# Patient Record
Sex: MALE | Race: WHITE | NOT HISPANIC OR LATINO | URBAN - METROPOLITAN AREA
[De-identification: names, ages, dates, MRNs, and addresses within clinical notes are randomized per-mention and may not be internally consistent; named-entity substitution may affect disease eponyms.]

---

## 2017-05-19 ENCOUNTER — OUTPATIENT (OUTPATIENT)
Dept: OUTPATIENT SERVICES | Facility: HOSPITAL | Age: 67
LOS: 1 days | Discharge: HOME | End: 2017-05-19
Payer: MEDICARE

## 2017-05-19 VITALS
HEART RATE: 66 BPM | OXYGEN SATURATION: 96 % | HEIGHT: 71 IN | WEIGHT: 184.97 LBS | RESPIRATION RATE: 16 BRPM | TEMPERATURE: 96 F | DIASTOLIC BLOOD PRESSURE: 91 MMHG | SYSTOLIC BLOOD PRESSURE: 138 MMHG

## 2017-05-19 VITALS
SYSTOLIC BLOOD PRESSURE: 131 MMHG | DIASTOLIC BLOOD PRESSURE: 86 MMHG | HEART RATE: 59 BPM | RESPIRATION RATE: 18 BRPM | OXYGEN SATURATION: 96 %

## 2017-05-19 DIAGNOSIS — Z98.890 OTHER SPECIFIED POSTPROCEDURAL STATES: Chronic | ICD-10-CM

## 2017-05-19 LAB
APPEARANCE UR: CLEAR — SIGNIFICANT CHANGE UP
BILIRUB UR-MCNC: NEGATIVE — SIGNIFICANT CHANGE UP
COLOR SPEC: YELLOW — SIGNIFICANT CHANGE UP
DIFF PNL FLD: NEGATIVE — SIGNIFICANT CHANGE UP
GLUCOSE UR QL: NEGATIVE — SIGNIFICANT CHANGE UP
KETONES UR-MCNC: NEGATIVE — SIGNIFICANT CHANGE UP
LEUKOCYTE ESTERASE UR-ACNC: NEGATIVE — SIGNIFICANT CHANGE UP
NITRITE UR-MCNC: NEGATIVE — SIGNIFICANT CHANGE UP
PH UR: 6 — SIGNIFICANT CHANGE UP (ref 5–8)
PROT UR-MCNC: NEGATIVE MG/DL — SIGNIFICANT CHANGE UP
SP GR SPEC: 1.02 — SIGNIFICANT CHANGE UP (ref 1–1.03)
UROBILINOGEN FLD QL: 0.2 E.U./DL — SIGNIFICANT CHANGE UP

## 2017-05-19 PROCEDURE — 81003 URINALYSIS AUTO W/O SCOPE: CPT

## 2017-05-19 PROCEDURE — 27430 REVISION OF THIGH MUSCLES: CPT | Mod: LT

## 2017-05-19 PROCEDURE — 93010 ELECTROCARDIOGRAM REPORT: CPT

## 2017-05-19 PROCEDURE — 93005 ELECTROCARDIOGRAM TRACING: CPT

## 2017-05-19 RX ORDER — OXYCODONE HYDROCHLORIDE 5 MG/1
1 TABLET ORAL
Qty: 42 | Refills: 0
Start: 2017-05-19 | End: 2017-05-26

## 2017-05-19 RX ORDER — SODIUM CHLORIDE 9 MG/ML
1000 INJECTION, SOLUTION INTRAVENOUS
Qty: 0 | Refills: 0 | Status: DISCONTINUED | OUTPATIENT
Start: 2017-05-19 | End: 2017-05-19

## 2017-05-24 DIAGNOSIS — S76.112A STRAIN OF LEFT QUADRICEPS MUSCLE, FASCIA AND TENDON, INITIAL ENCOUNTER: ICD-10-CM
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
## 2017-05-24 DIAGNOSIS — I10 ESSENTIAL (PRIMARY) HYPERTENSION: ICD-10-CM

## 2017-05-24 DIAGNOSIS — Z79.899 OTHER LONG TERM (CURRENT) DRUG THERAPY: ICD-10-CM

## 2018-10-10 PROBLEM — I10 ESSENTIAL (PRIMARY) HYPERTENSION: Chronic | Status: ACTIVE | Noted: 2017-05-19

## 2018-10-15 ENCOUNTER — RESULT REVIEW (OUTPATIENT)
Age: 68
End: 2018-10-15

## 2018-10-15 ENCOUNTER — OUTPATIENT (OUTPATIENT)
Dept: OUTPATIENT SERVICES | Facility: HOSPITAL | Age: 68
LOS: 1 days | Discharge: HOME | End: 2018-10-15

## 2018-10-15 DIAGNOSIS — Z98.890 OTHER SPECIFIED POSTPROCEDURAL STATES: Chronic | ICD-10-CM

## 2018-10-18 LAB — SURGICAL PATHOLOGY STUDY: SIGNIFICANT CHANGE UP

## 2019-02-03 NOTE — ASU PREOP CHECKLIST - AICD PRESENT
no I, Bhupendra Virk, performed the initial face to face bedside interview with this patient regarding history of present illness, review of symptoms and relevant past medical, social and family history.  I completed an independent physical examination.  I was the initial provider who evaluated this patient. I have signed out the follow up of any pending tests (i.e. labs, radiological studies) to the ACP.  I have communicated the patient’s plan of care and disposition with the ACP.

## 2019-04-11 ENCOUNTER — RECORD ABSTRACTING (OUTPATIENT)
Age: 69
End: 2019-04-11

## 2019-04-11 ENCOUNTER — APPOINTMENT (OUTPATIENT)
Dept: ORTHOPEDIC SURGERY | Facility: CLINIC | Age: 69
End: 2019-04-11
Payer: MEDICARE

## 2019-04-11 DIAGNOSIS — M25.469 EFFUSION, UNSPECIFIED KNEE: ICD-10-CM

## 2019-04-11 DIAGNOSIS — Z78.9 OTHER SPECIFIED HEALTH STATUS: ICD-10-CM

## 2019-04-11 DIAGNOSIS — M17.11 UNILATERAL PRIMARY OSTEOARTHRITIS, RIGHT KNEE: ICD-10-CM

## 2019-04-11 PROBLEM — Z00.00 ENCOUNTER FOR PREVENTIVE HEALTH EXAMINATION: Status: ACTIVE | Noted: 2019-04-11

## 2019-04-11 PROCEDURE — 20611 DRAIN/INJ JOINT/BURSA W/US: CPT | Mod: RT

## 2019-04-11 PROCEDURE — 99213 OFFICE O/P EST LOW 20 MIN: CPT | Mod: 25

## 2019-04-11 PROCEDURE — 99203 OFFICE O/P NEW LOW 30 MIN: CPT | Mod: 25

## 2019-04-11 RX ORDER — HYALURONATE SODIUM 30 MG/2 ML
30 SYRINGE (ML) INTRAARTICULAR
Refills: 0 | Status: ACTIVE | COMMUNITY

## 2019-04-11 NOTE — PROCEDURE
[de-identified] : Patient was given the presence of a series of 3 right knee Orthovisc injection today. Patient had the injection performed on the lateral aspect of the right knee under sterile conditions and ultrasound guidance

## 2019-04-11 NOTE — PHYSICAL EXAM
[de-identified] : Right knee has tenderness at the medial joint line there is warmth about the knee mild R. supination range of motion is 8-110°. Neurovascularly intact distally. No effusion noted today.

## 2019-04-11 NOTE — HISTORY OF PRESENT ILLNESS
[de-identified] : Dr. Magallanes is here for a first incision of 3 Orthovisc injections into the right knee replacement has osteoarthritis tricompartmental

## 2019-04-17 ENCOUNTER — LABORATORY RESULT (OUTPATIENT)
Age: 69
End: 2019-04-17

## 2019-04-18 ENCOUNTER — APPOINTMENT (OUTPATIENT)
Dept: ORTHOPEDIC SURGERY | Facility: CLINIC | Age: 69
End: 2019-04-18
Payer: MEDICARE

## 2019-04-18 PROCEDURE — 99214 OFFICE O/P EST MOD 30 MIN: CPT | Mod: 25

## 2019-04-18 PROCEDURE — 20611 DRAIN/INJ JOINT/BURSA W/US: CPT | Mod: RT

## 2019-04-18 NOTE — HISTORY OF PRESENT ILLNESS
[de-identified] : SEEN LAST WEEK RIGHT KNEE PAIN - ASPIRATED. FEELS LIKE KNEE IS FILLED WITH FLUID AGAIN.

## 2019-04-18 NOTE — DISCUSSION/SUMMARY
[de-identified] : Patient presented with a second effusion we will send the specimen for culture and fluid analysis.

## 2019-04-18 NOTE — PHYSICAL EXAM
[de-identified] : Patient is a moderate-sized right knee effusion today. There is no warmth no drainage no erythema. Range of motion is 0-110°. The knee is stable

## 2019-04-18 NOTE — PROCEDURE
[de-identified] : Patient had the right knee aspirated yielding approximately 30 cc of clear straw-colored fluid. Patient tolerated well using the same needle as an entry portal we were able to give a second series of 3 Orthovisc injections.

## 2019-05-02 ENCOUNTER — APPOINTMENT (OUTPATIENT)
Dept: ORTHOPEDIC SURGERY | Facility: CLINIC | Age: 69
End: 2019-05-02

## 2021-12-23 ENCOUNTER — APPOINTMENT (OUTPATIENT)
Dept: ORTHOPEDIC SURGERY | Facility: CLINIC | Age: 71
End: 2021-12-23
Payer: MEDICARE

## 2021-12-23 PROCEDURE — 73562 X-RAY EXAM OF KNEE 3: CPT | Mod: RT

## 2021-12-23 PROCEDURE — 99214 OFFICE O/P EST MOD 30 MIN: CPT | Mod: 25

## 2021-12-23 PROCEDURE — 20611 DRAIN/INJ JOINT/BURSA W/US: CPT | Mod: RT

## 2021-12-23 RX ORDER — HYALURONATE SODIUM, STABILIZED 88 MG/4 ML
88 SYRINGE (ML) INTRAARTICULAR
Qty: 1 | Refills: 0 | Status: ACTIVE | OUTPATIENT
Start: 2021-12-23

## 2021-12-23 NOTE — PHYSICAL EXAM
[de-identified] : Right knee has definite warmth medial joint line tenderness is noted there is crepitus with range of motion 0-130°. No obvious effusion is noted there is definite tenderness with patellofemoral compression. [de-identified] : AP standing individual and sunrise views were obtained showing severe patellofemoral arthritis with complete cartilage loss in the lateral patella facet and moderate to advanced narrowing of medial compartment on standing AP projection of the right

## 2021-12-23 NOTE — DISCUSSION/SUMMARY
[de-identified] : Patient is suffering from advanced patellofemoral arthritis as well as moderate to advanced medial compartment arthritis of the right knee. We talked at length about the potential need for knee replacement surgery in the future for now we will continue with conservative measures in addition to the cortisone injection patient was prescribed a Medrol Dosepak and he will return to the office once his modest injection has arrived.

## 2021-12-23 NOTE — HISTORY OF PRESENT ILLNESS
[de-identified] : This is an old patient not seen in nearly 2-1/2 years. He is here with a complaint of worsening right knee pain he has pain with sitting going up and down stairs getting out of a seated position he also has in addition to anterior knee pain pain in the posterior lateral aspect of the knee. He presents with outside MRI the findings of which show no muscular issues but severe patellofemoral arthritis and moderate medial compartment arthritis. Patient states his pain is worsening and has been ongoing for 6 months.\par \par The patient states he's tried all types of anti-inflammatory medications such as Celebrex with minimal improvement.

## 2021-12-23 NOTE — PROCEDURE
[de-identified] : Patient was given a cortisone injection (Lidocaine 1% 4 cc + 10 mg of Kenalog) in the lateral compartment of the knee. Injection is performed under sterile conditions with ultrasound guidance. Patient tolerated procedure well. If patient has a history of insulin- dependant diabetes they were informed of possible increase of blood glucose levels and instructed to adjust insulin accordingly.\par \par

## 2022-01-13 ENCOUNTER — APPOINTMENT (OUTPATIENT)
Dept: ORTHOPEDIC SURGERY | Facility: CLINIC | Age: 72
End: 2022-01-13
Payer: MEDICARE

## 2022-01-13 PROCEDURE — 99213 OFFICE O/P EST LOW 20 MIN: CPT

## 2022-01-13 NOTE — DISCUSSION/SUMMARY
[de-identified] : Surgical risks reviewed. The reasonable risks and benefits of knee replacement surgery discussed in detail with the patient. Patient asked appropriate questions which were answered to their satisfaction. We talked about potential complications including complications they may require revision surgery such as component loosening failure migration wear and also potential complications of infection and stiffness.\par \par We will see how the patient responds to these conservative treatments. We talked about the potential reuse of such medication every 6 months if sustained clinical benefit was achieved.\par \par

## 2022-01-13 NOTE — HISTORY OF PRESENT ILLNESS
[de-identified] : Patient returns today he felt the only modest improvement in 2-3 weeks with the cortisone injection he received in the right knee on the last visit. He is here to discuss further options.

## 2022-01-13 NOTE — PHYSICAL EXAM
[de-identified] : Right knee on exam there is definite warmth about the medial joint line. There is also crepitus with range of motion 0-125°. Pain is elicited with patellofemoral compression. The knee is stable to AP stress varus valgus stress both in full extension and 90° of flexion.

## 2022-01-13 NOTE — REASON FOR VISIT
[Follow-Up Visit] : a follow-up visit for [Knee Pain] : knee pain [FreeTextEntry2] : right knee follow up

## 2022-02-17 ENCOUNTER — APPOINTMENT (OUTPATIENT)
Dept: ORTHOPEDIC SURGERY | Facility: CLINIC | Age: 72
End: 2022-02-17
Payer: MEDICARE

## 2022-02-17 PROCEDURE — 99213 OFFICE O/P EST LOW 20 MIN: CPT | Mod: 25

## 2022-02-17 PROCEDURE — 20611 DRAIN/INJ JOINT/BURSA W/US: CPT | Mod: RT

## 2022-02-17 NOTE — PROCEDURE
[de-identified] : Patient was given a cortisone injection (Lidocaine 1% 4 cc + 10 mg of Kenalog) in the lateral compartment of the right  knee. Injection is performed under sterile conditions with ultrasound guidance. Patient tolerated procedure well. If patient has a history of insulin- dependant diabetes they were informed of possible increase of blood glucose levels and instructed to adjust insulin accordingly.\par \par

## 2022-02-17 NOTE — HISTORY OF PRESENT ILLNESS
[de-identified] : Patient returns today with increasing right knee pain. Patient states the pain is mostly in the posterior lateral area of the knee in the soft tissues. He states that the recent Synvisc one injection seemed to actually make things worse versus improving his symptoms. Patient has difficulty with driving his difficulty was standing out of a seated position and going up and down steps the pain seems to be worsening well localized to the right knee. Recall he had an MRI recently that showed fairly advanced patellofemoral and medial compartment osteoarthritis.

## 2022-02-17 NOTE — PHYSICAL EXAM
[de-identified] : Right knee has definite warmth medial joint line tenderness is noted there is crepitus with range of motion 0-130°. No obvious effusion is noted there is definite tenderness with patellofemoral compression.

## 2022-02-17 NOTE — DISCUSSION/SUMMARY
[Surgical risks reviewed] : Surgical risks reviewed [de-identified] : Patient's diagnosis is that of advanced osteoarthritis of the right knee. We spoke at length about her options I believe he would be best to consider knee replacement surgery at this point. The reasonable risks and benefits were discussed patient asked appropriate questions. As a temporizing measure patient was given a cortisone injection today into the lateral compartment of the right knee. She will continue to take anti-inflammatories as needed ice the knee followup with me when he is ready to proceed.

## 2022-04-05 ENCOUNTER — TRANSCRIPTION ENCOUNTER (OUTPATIENT)
Age: 72
End: 2022-04-05

## 2022-04-05 RX ORDER — POVIDONE-IODINE 5 %
1 AEROSOL (ML) TOPICAL ONCE
Refills: 0 | Status: COMPLETED | OUTPATIENT
Start: 2022-04-06 | End: 2022-04-06

## 2022-04-05 RX ORDER — AMLODIPINE BESYLATE 2.5 MG/1
1 TABLET ORAL
Qty: 0 | Refills: 0 | DISCHARGE

## 2022-04-05 RX ORDER — CHLORHEXIDINE GLUCONATE 213 G/1000ML
1 SOLUTION TOPICAL ONCE
Refills: 0 | Status: DISCONTINUED | OUTPATIENT
Start: 2022-04-06 | End: 2022-04-07

## 2022-04-05 NOTE — H&P ADULT - NSHPLABSRESULTS_GEN_ALL_CORE
Preop CBC, BMP within normal range  COVID: ***  Cr- 1.0  UA:   3M: DOS  T + S: DOS  Preop EKG WNL and reviewed per medical clearance  ECHO 04/2021, EF 60-65%  Stress test 03/2022, negative for ischemia Preop CBC, BMP within normal range  COVID: negative  Cr- 1.0  UA:   3M: DOS  T + S: DOS  Preop EKG WNL and reviewed per medical clearance  ECHO 04/2021, EF 60-65%  Stress test 03/2022, negative for ischemia

## 2022-04-05 NOTE — H&P ADULT - NSHPPHYSICALEXAM_GEN_ALL_CORE
Gen: 70 y/o, NAD  MSK: Decreased right knee ROM secondary to pain Gen: 72 y/o, NAD  MSK: Decreased right knee ROM secondary to pain  Skin warm and well perfused, no visible wounds/erythema/ecchymoses  EHL/FHL/TA/GS 5/5 motor strength bilateral lower extremities   SLT in tact to distal bilateral lower extremities   DP pulses palpable bilaterally  Remainder of exam per medical clearance note

## 2022-04-05 NOTE — PATIENT PROFILE ADULT - FALL HARM RISK - UNIVERSAL INTERVENTIONS
Bed in lowest position, wheels locked, appropriate side rails in place/Call bell, personal items and telephone in reach/Instruct patient to call for assistance before getting out of bed or chair/Non-slip footwear when patient is out of bed/[place] to call system/Physically safe environment - no spills, clutter or unnecessary equipment/Purposeful Proactive Rounding/Room/bathroom lighting operational, light cord in reach

## 2022-04-05 NOTE — H&P ADULT - HISTORY OF PRESENT ILLNESS
71M with right knee pain x    Presents for elective R TKR. 71M with right knee pain x chronic. Pt denies acute preceding trauma/injury. Pt states his right knee pain is localized; he reports intermittent numbness at the posterior aspect of his right knee. Denies bilateral lower extremity weakness. Pt does not ambulate with an assistive device at baseline. Denies DVT hx; denies CP, SOB, N/V, tactile fevers, calf pain.     Presents for elective Right TKR.

## 2022-04-06 ENCOUNTER — APPOINTMENT (OUTPATIENT)
Dept: ORTHOPEDIC SURGERY | Facility: HOSPITAL | Age: 72
End: 2022-04-06
Payer: MEDICARE

## 2022-04-06 ENCOUNTER — INPATIENT (INPATIENT)
Facility: HOSPITAL | Age: 72
LOS: 0 days | Discharge: HOME HEALTH | DRG: 470 | End: 2022-04-07
Attending: SPECIALIST | Admitting: SPECIALIST
Payer: COMMERCIAL

## 2022-04-06 VITALS
TEMPERATURE: 97 F | HEIGHT: 69.29 IN | OXYGEN SATURATION: 98 % | SYSTOLIC BLOOD PRESSURE: 122 MMHG | WEIGHT: 165.79 LBS | DIASTOLIC BLOOD PRESSURE: 78 MMHG | RESPIRATION RATE: 18 BRPM | HEART RATE: 63 BPM

## 2022-04-06 DIAGNOSIS — M17.11 UNILATERAL PRIMARY OSTEOARTHRITIS, RIGHT KNEE: ICD-10-CM

## 2022-04-06 DIAGNOSIS — I10 ESSENTIAL (PRIMARY) HYPERTENSION: ICD-10-CM

## 2022-04-06 DIAGNOSIS — Z98.890 OTHER SPECIFIED POSTPROCEDURAL STATES: Chronic | ICD-10-CM

## 2022-04-06 LAB — APTT BLD: 34.2 SEC — SIGNIFICANT CHANGE UP (ref 27.5–35.5)

## 2022-04-06 PROCEDURE — 73560 X-RAY EXAM OF KNEE 1 OR 2: CPT | Mod: 26,RT

## 2022-04-06 PROCEDURE — 27447 TOTAL KNEE ARTHROPLASTY: CPT | Mod: AS,RT

## 2022-04-06 PROCEDURE — 27447 TOTAL KNEE ARTHROPLASTY: CPT | Mod: RT

## 2022-04-06 DEVICE — CEMENT PALACOS R: Type: IMPLANTABLE DEVICE | Status: FUNCTIONAL

## 2022-04-06 DEVICE — PLATE BASE TIB JOURNEY: Type: IMPLANTABLE DEVICE | Status: FUNCTIONAL

## 2022-04-06 DEVICE — IMPLANTABLE DEVICE: Type: IMPLANTABLE DEVICE | Status: FUNCTIONAL

## 2022-04-06 DEVICE — FEM COMP JRNY II BCS BICRUCIATE RT SZ 5: Type: IMPLANTABLE DEVICE | Status: FUNCTIONAL

## 2022-04-06 DEVICE — PATELLA 35MM JOURNEY 7.5 RND RSRF: Type: IMPLANTABLE DEVICE | Status: FUNCTIONAL

## 2022-04-06 RX ORDER — ACETAMINOPHEN 500 MG
650 TABLET ORAL EVERY 6 HOURS
Refills: 0 | Status: DISCONTINUED | OUTPATIENT
Start: 2022-04-06 | End: 2022-04-07

## 2022-04-06 RX ORDER — HYDROMORPHONE HYDROCHLORIDE 2 MG/ML
0.5 INJECTION INTRAMUSCULAR; INTRAVENOUS; SUBCUTANEOUS
Refills: 0 | Status: DISCONTINUED | OUTPATIENT
Start: 2022-04-06 | End: 2022-04-07

## 2022-04-06 RX ORDER — OXYCODONE HYDROCHLORIDE 5 MG/1
10 TABLET ORAL ONCE
Refills: 0 | Status: DISCONTINUED | OUTPATIENT
Start: 2022-04-06 | End: 2022-04-06

## 2022-04-06 RX ORDER — MAGNESIUM HYDROXIDE 400 MG/1
30 TABLET, CHEWABLE ORAL DAILY
Refills: 0 | Status: DISCONTINUED | OUTPATIENT
Start: 2022-04-06 | End: 2022-04-07

## 2022-04-06 RX ORDER — METOPROLOL TARTRATE 50 MG
50 TABLET ORAL DAILY
Refills: 0 | Status: DISCONTINUED | OUTPATIENT
Start: 2022-04-06 | End: 2022-04-07

## 2022-04-06 RX ORDER — SENNA PLUS 8.6 MG/1
2 TABLET ORAL AT BEDTIME
Refills: 0 | Status: DISCONTINUED | OUTPATIENT
Start: 2022-04-06 | End: 2022-04-07

## 2022-04-06 RX ORDER — HYDROCHLOROTHIAZIDE 25 MG
12.5 TABLET ORAL DAILY
Refills: 0 | Status: DISCONTINUED | OUTPATIENT
Start: 2022-04-06 | End: 2022-04-07

## 2022-04-06 RX ORDER — SODIUM CHLORIDE 9 MG/ML
1000 INJECTION, SOLUTION INTRAVENOUS
Refills: 0 | Status: DISCONTINUED | OUTPATIENT
Start: 2022-04-06 | End: 2022-04-07

## 2022-04-06 RX ORDER — ONDANSETRON 8 MG/1
4 TABLET, FILM COATED ORAL EVERY 6 HOURS
Refills: 0 | Status: DISCONTINUED | OUTPATIENT
Start: 2022-04-06 | End: 2022-04-07

## 2022-04-06 RX ORDER — OXYCODONE HYDROCHLORIDE 5 MG/1
5 TABLET ORAL
Refills: 0 | Status: DISCONTINUED | OUTPATIENT
Start: 2022-04-06 | End: 2022-04-07

## 2022-04-06 RX ORDER — OXYCODONE HYDROCHLORIDE 5 MG/1
10 TABLET ORAL
Refills: 0 | Status: DISCONTINUED | OUTPATIENT
Start: 2022-04-06 | End: 2022-04-07

## 2022-04-06 RX ORDER — CELECOXIB 200 MG/1
200 CAPSULE ORAL ONCE
Refills: 0 | Status: COMPLETED | OUTPATIENT
Start: 2022-04-06 | End: 2022-04-06

## 2022-04-06 RX ORDER — CEFAZOLIN SODIUM 1 G
2000 VIAL (EA) INJECTION EVERY 8 HOURS
Refills: 0 | Status: COMPLETED | OUTPATIENT
Start: 2022-04-06 | End: 2022-04-07

## 2022-04-06 RX ORDER — POLYETHYLENE GLYCOL 3350 17 G/17G
17 POWDER, FOR SOLUTION ORAL AT BEDTIME
Refills: 0 | Status: DISCONTINUED | OUTPATIENT
Start: 2022-04-06 | End: 2022-04-07

## 2022-04-06 RX ORDER — ASPIRIN/CALCIUM CARB/MAGNESIUM 324 MG
325 TABLET ORAL DAILY
Refills: 0 | Status: DISCONTINUED | OUTPATIENT
Start: 2022-04-06 | End: 2022-04-07

## 2022-04-06 RX ORDER — AMLODIPINE BESYLATE 2.5 MG/1
5 TABLET ORAL DAILY
Refills: 0 | Status: DISCONTINUED | OUTPATIENT
Start: 2022-04-06 | End: 2022-04-07

## 2022-04-06 RX ORDER — KETOROLAC TROMETHAMINE 30 MG/ML
15 SYRINGE (ML) INJECTION EVERY 6 HOURS
Refills: 0 | Status: DISCONTINUED | OUTPATIENT
Start: 2022-04-06 | End: 2022-04-07

## 2022-04-06 RX ORDER — MORPHINE SULFATE 50 MG/1
2 CAPSULE, EXTENDED RELEASE ORAL EVERY 4 HOURS
Refills: 0 | Status: DISCONTINUED | OUTPATIENT
Start: 2022-04-06 | End: 2022-04-07

## 2022-04-06 RX ORDER — LOSARTAN POTASSIUM 100 MG/1
50 TABLET, FILM COATED ORAL DAILY
Refills: 0 | Status: DISCONTINUED | OUTPATIENT
Start: 2022-04-06 | End: 2022-04-07

## 2022-04-06 RX ORDER — CEFAZOLIN SODIUM 1 G
2000 VIAL (EA) INJECTION EVERY 8 HOURS
Refills: 0 | Status: DISCONTINUED | OUTPATIENT
Start: 2022-04-06 | End: 2022-04-06

## 2022-04-06 RX ADMIN — Medication 2000 MILLIGRAM(S): at 18:14

## 2022-04-06 RX ADMIN — Medication 650 MILLIGRAM(S): at 23:22

## 2022-04-06 RX ADMIN — Medication 1 APPLICATION(S): at 09:14

## 2022-04-06 RX ADMIN — Medication 650 MILLIGRAM(S): at 18:13

## 2022-04-06 RX ADMIN — OXYCODONE HYDROCHLORIDE 10 MILLIGRAM(S): 5 TABLET ORAL at 10:43

## 2022-04-06 RX ADMIN — CELECOXIB 200 MILLIGRAM(S): 200 CAPSULE ORAL at 10:43

## 2022-04-06 RX ADMIN — Medication 650 MILLIGRAM(S): at 19:00

## 2022-04-06 RX ADMIN — Medication 15 MILLIGRAM(S): at 23:22

## 2022-04-06 RX ADMIN — Medication 15 MILLIGRAM(S): at 18:35

## 2022-04-06 RX ADMIN — Medication 15 MILLIGRAM(S): at 18:13

## 2022-04-06 NOTE — PHYSICAL THERAPY INITIAL EVALUATION ADULT - PERTINENT HX OF CURRENT PROBLEM, REHAB EVAL
71M with right knee pain x chronic. Pt denies acute preceding trauma/injury. Pt states his right knee pain is localized; he reports intermittent numbness at the posterior aspect of his right knee. Denies bilateral lower extremity weakness. Pt does not ambulate with an assistive device at baseline. Denies DVT hx; denies CP, SOB, N/V, tactile fevers, calf pain.

## 2022-04-06 NOTE — PHYSICAL THERAPY INITIAL EVALUATION ADULT - GENERAL OBSERVATIONS, REHAB EVAL
Patient received semi-white in bed  in NAD on RA, +SCDs, +PIV. Cleared by TYLER Will. Agreeable to PT.

## 2022-04-06 NOTE — PHYSICAL THERAPY INITIAL EVALUATION ADULT - ADDITIONAL COMMENTS
Patient lives with spouse in private house with 4 steps to enter and 1 FOS inside. Does not use any Assistive Devices at baseline. Denies recent Hx of falls.

## 2022-04-07 ENCOUNTER — TRANSCRIPTION ENCOUNTER (OUTPATIENT)
Age: 72
End: 2022-04-07

## 2022-04-07 VITALS
OXYGEN SATURATION: 96 % | TEMPERATURE: 98 F | DIASTOLIC BLOOD PRESSURE: 67 MMHG | RESPIRATION RATE: 17 BRPM | SYSTOLIC BLOOD PRESSURE: 98 MMHG | HEART RATE: 78 BPM

## 2022-04-07 LAB
ANION GAP SERPL CALC-SCNC: 11 MMOL/L — SIGNIFICANT CHANGE UP (ref 5–17)
BUN SERPL-MCNC: 19 MG/DL — SIGNIFICANT CHANGE UP (ref 7–23)
CALCIUM SERPL-MCNC: 9 MG/DL — SIGNIFICANT CHANGE UP (ref 8.4–10.5)
CHLORIDE SERPL-SCNC: 98 MMOL/L — SIGNIFICANT CHANGE UP (ref 96–108)
CO2 SERPL-SCNC: 24 MMOL/L — SIGNIFICANT CHANGE UP (ref 22–31)
CREAT SERPL-MCNC: 0.92 MG/DL — SIGNIFICANT CHANGE UP (ref 0.5–1.3)
EGFR: 88 ML/MIN/1.73M2 — SIGNIFICANT CHANGE UP
GLUCOSE SERPL-MCNC: 132 MG/DL — HIGH (ref 70–99)
HCT VFR BLD CALC: 35.8 % — LOW (ref 39–50)
HCV AB S/CO SERPL IA: 0.04 S/CO — SIGNIFICANT CHANGE UP
HCV AB SERPL-IMP: SIGNIFICANT CHANGE UP
HGB BLD-MCNC: 12.2 G/DL — LOW (ref 13–17)
INR BLD: 1.11 — SIGNIFICANT CHANGE UP (ref 0.88–1.16)
MCHC RBC-ENTMCNC: 32 PG — SIGNIFICANT CHANGE UP (ref 27–34)
MCHC RBC-ENTMCNC: 34.1 GM/DL — SIGNIFICANT CHANGE UP (ref 32–36)
MCV RBC AUTO: 94 FL — SIGNIFICANT CHANGE UP (ref 80–100)
NRBC # BLD: 0 /100 WBCS — SIGNIFICANT CHANGE UP (ref 0–0)
PLATELET # BLD AUTO: 197 K/UL — SIGNIFICANT CHANGE UP (ref 150–400)
POTASSIUM SERPL-MCNC: 4.4 MMOL/L — SIGNIFICANT CHANGE UP (ref 3.5–5.3)
POTASSIUM SERPL-SCNC: 4.4 MMOL/L — SIGNIFICANT CHANGE UP (ref 3.5–5.3)
PROTHROM AB SERPL-ACNC: 13.2 SEC — SIGNIFICANT CHANGE UP (ref 10.5–13.4)
RBC # BLD: 3.81 M/UL — LOW (ref 4.2–5.8)
RBC # FLD: 13.5 % — SIGNIFICANT CHANGE UP (ref 10.3–14.5)
SODIUM SERPL-SCNC: 133 MMOL/L — LOW (ref 135–145)
WBC # BLD: 10.33 K/UL — SIGNIFICANT CHANGE UP (ref 3.8–10.5)
WBC # FLD AUTO: 10.33 K/UL — SIGNIFICANT CHANGE UP (ref 3.8–10.5)

## 2022-04-07 PROCEDURE — 27447 TOTAL KNEE ARTHROPLASTY: CPT

## 2022-04-07 PROCEDURE — 85610 PROTHROMBIN TIME: CPT

## 2022-04-07 PROCEDURE — 73560 X-RAY EXAM OF KNEE 1 OR 2: CPT

## 2022-04-07 PROCEDURE — 85027 COMPLETE CBC AUTOMATED: CPT

## 2022-04-07 PROCEDURE — C1713: CPT

## 2022-04-07 PROCEDURE — 97116 GAIT TRAINING THERAPY: CPT

## 2022-04-07 PROCEDURE — 36415 COLL VENOUS BLD VENIPUNCTURE: CPT

## 2022-04-07 PROCEDURE — 86803 HEPATITIS C AB TEST: CPT

## 2022-04-07 PROCEDURE — 97161 PT EVAL LOW COMPLEX 20 MIN: CPT

## 2022-04-07 PROCEDURE — 80048 BASIC METABOLIC PNL TOTAL CA: CPT

## 2022-04-07 PROCEDURE — C1776: CPT

## 2022-04-07 PROCEDURE — 85730 THROMBOPLASTIN TIME PARTIAL: CPT

## 2022-04-07 RX ORDER — AMLODIPINE BESYLATE 2.5 MG/1
1 TABLET ORAL
Qty: 0 | Refills: 0 | DISCHARGE
Start: 2022-04-07

## 2022-04-07 RX ORDER — METOPROLOL TARTRATE 50 MG
1 TABLET ORAL
Qty: 0 | Refills: 0 | DISCHARGE
Start: 2022-04-07

## 2022-04-07 RX ORDER — OXYCODONE HYDROCHLORIDE 5 MG/1
1 TABLET ORAL
Qty: 42 | Refills: 0
Start: 2022-04-07 | End: 2022-04-13

## 2022-04-07 RX ORDER — SENNA PLUS 8.6 MG/1
2 TABLET ORAL
Qty: 0 | Refills: 0 | DISCHARGE
Start: 2022-04-07

## 2022-04-07 RX ORDER — ASPIRIN/CALCIUM CARB/MAGNESIUM 324 MG
1 TABLET ORAL
Qty: 10 | Refills: 0
Start: 2022-04-07 | End: 2022-04-16

## 2022-04-07 RX ORDER — AMLODIPINE BESYLATE 2.5 MG/1
1 TABLET ORAL
Qty: 0 | Refills: 0 | DISCHARGE

## 2022-04-07 RX ORDER — LOSARTAN POTASSIUM 100 MG/1
1 TABLET, FILM COATED ORAL
Qty: 0 | Refills: 0 | DISCHARGE
Start: 2022-04-07

## 2022-04-07 RX ORDER — METOPROLOL TARTRATE 50 MG
1 TABLET ORAL
Qty: 0 | Refills: 0 | DISCHARGE

## 2022-04-07 RX ORDER — HYDROCHLOROTHIAZIDE 25 MG
1 TABLET ORAL
Qty: 0 | Refills: 0 | DISCHARGE
Start: 2022-04-07

## 2022-04-07 RX ORDER — ACETAMINOPHEN 500 MG
2 TABLET ORAL
Qty: 0 | Refills: 0 | DISCHARGE
Start: 2022-04-07

## 2022-04-07 RX ORDER — LOSARTAN POTASSIUM 100 MG/1
1 TABLET, FILM COATED ORAL
Qty: 0 | Refills: 0 | DISCHARGE

## 2022-04-07 RX ADMIN — Medication 650 MILLIGRAM(S): at 18:17

## 2022-04-07 RX ADMIN — Medication 50 MILLIGRAM(S): at 05:54

## 2022-04-07 RX ADMIN — Medication 650 MILLIGRAM(S): at 12:41

## 2022-04-07 RX ADMIN — Medication 15 MILLIGRAM(S): at 06:45

## 2022-04-07 RX ADMIN — Medication 15 MILLIGRAM(S): at 11:57

## 2022-04-07 RX ADMIN — Medication 325 MILLIGRAM(S): at 11:42

## 2022-04-07 RX ADMIN — Medication 2000 MILLIGRAM(S): at 02:12

## 2022-04-07 RX ADMIN — LOSARTAN POTASSIUM 50 MILLIGRAM(S): 100 TABLET, FILM COATED ORAL at 05:54

## 2022-04-07 RX ADMIN — Medication 650 MILLIGRAM(S): at 05:54

## 2022-04-07 RX ADMIN — Medication 15 MILLIGRAM(S): at 00:22

## 2022-04-07 RX ADMIN — AMLODIPINE BESYLATE 5 MILLIGRAM(S): 2.5 TABLET ORAL at 05:54

## 2022-04-07 RX ADMIN — Medication 650 MILLIGRAM(S): at 06:54

## 2022-04-07 RX ADMIN — Medication 650 MILLIGRAM(S): at 11:41

## 2022-04-07 RX ADMIN — Medication 15 MILLIGRAM(S): at 05:54

## 2022-04-07 RX ADMIN — Medication 15 MILLIGRAM(S): at 11:42

## 2022-04-07 RX ADMIN — Medication 650 MILLIGRAM(S): at 00:22

## 2022-04-07 RX ADMIN — Medication 12.5 MILLIGRAM(S): at 05:54

## 2022-04-07 NOTE — DISCHARGE NOTE NURSING/CASE MANAGEMENT/SOCIAL WORK - NSDCPEFALRISK_GEN_ALL_CORE
For information on Fall & Injury Prevention, visit: [email or web address] OR  [email or web address] OR  https://www.cdc.gov/steadi/patient.html

## 2022-04-07 NOTE — DISCHARGE NOTE PROVIDER - HOSPITAL COURSE
Admitted- 4-6-2022  Surgery- s/p Right TKR   Chyna-op Antibiotics  Pain control  DVT prophylaxis  OOB/Physical Therapy

## 2022-04-07 NOTE — DISCHARGE NOTE PROVIDER - NSDCFUADDINST_GEN_ALL_CORE_FT
Weight bear as tolerated with assistive device.  No strenuous activity, heavy lifting, driving or returning to work until cleared by MD.  You may shower - dressing is water-resistant, no soaking in bathtubs.  Remove dressing after post op day 5-7, then leave incision open to air. Keep incision clean and dry.  Try to have regular bowel movements, take stool softener or laxative if necessary.  May take Pepcid or Prilosec for upset stomach.  May take Aleve or Naproxen if needed.  Do not apply any creams or ointments on the incision or around the incision/dressing.  Swelling may travel all the way down leg to foot, this is normal and will subside in a few weeks.  Call to schedule an appt with Dr. Alcazar for follow up, if you have staples or sutures they will be removed in office.  Contact your doctor if you experience: fever greater than 101.5, chills, chest pain, difficulty breathing, redness or excessive drainage around the incision, other concerns.  Follow up with your primary care provider.

## 2022-04-07 NOTE — DISCHARGE NOTE PROVIDER - NSDCMRMEDTOKEN_GEN_ALL_CORE_FT
amLODIPine 5 mg oral tablet: 1 tab(s) orally once a day  hydroCHLOROthiazide 12.5 mg oral capsule: 1 cap(s) orally once a day  losartan 50 mg oral tablet: 1 tab(s) orally once a day  metoprolol succinate 50 mg oral tablet, extended release: 1 tab(s) orally once a day   Outpatient physical therapy : 3 times a week for 3 weeks.   ICD10: Z96.651  s/p Right TKR   acetaminophen 325 mg oral tablet: 2 tab(s) orally every 6 hours  amLODIPine 5 mg oral tablet: 1 tab(s) orally once a day  aspirin 325 mg oral delayed release tablet: 1 tab(s) orally once a day MDD:1  hydroCHLOROthiazide 12.5 mg oral capsule: 1 cap(s) orally once a day  losartan 50 mg oral tablet: 1 tab(s) orally once a day  metoprolol succinate 50 mg oral tablet, extended release: 1 tab(s) orally once a day  Outpatient physical therapy : 3 times a week for 3 weeks.   ICD10: Z96.651  s/p Right TKR  oxyCODONE 5 mg oral tablet: 1 tab(s) orally every 4 hours, As Needed -severe Pain (7 - 19) MDD:6  senna oral tablet: 2 tab(s) orally once a day (at bedtime)

## 2022-04-07 NOTE — DISCHARGE NOTE PROVIDER - CARE PROVIDER_API CALL
Rafael Alcazar)  Orthopaedic Surgery  [address] [place] [address], [address] [place] [address]  Phone: [phone]  Fax: [phone]  Follow Up Time: 1 week

## 2022-04-07 NOTE — PROGRESS NOTE ADULT - SUBJECTIVE AND OBJECTIVE BOX
Ortho Post Op Check    Procedure: R TKA  Surgeon: Dr. DANGELO Alcazar    Subjective:  Pain controlled with medication.  Denies CP, SOB, N/V, numbness/tingling.    Objective:  Vital Signs Last 24 Hrs  T(C): 35.7 (04-06-22 @ 14:18), Max: 35.7 (04-06-22 @ 14:18)  T(F): 96.2 (04-06-22 @ 14:18), Max: 96.2 (04-06-22 @ 14:18)  HR: 64 (04-06-22 @ 16:18) (52 - 64)  BP: 129/79 (04-06-22 @ 16:03) (114/73 - 133/77)  BP(mean): 99 (04-06-22 @ 16:03) (89 - 99)  RR: 21 (04-06-22 @ 16:18) (8 - 21)  SpO2: 99% (04-06-22 @ 16:18) (99% - 100%)  AVSS    General: Pt Alert and oriented, NAD  RLE:  Dressing C/D/I  Motor: 5/5 EHL/FHL/TA/GS b/l  Sensation: SILT throughout all nerve distributions  Pulses: Toes WWP    A/P: 71yMale s/p R TKA on 04-06.  - Stable  - Pain Control  - DVT ppx: SCDs, ASA  - Post op abx: Ancef 2g q8h x2 postoperative doses  - Resume home meds  - PT, WBS: WBAT    Ortho Pager [phone]
Ortho Note    Subjective:  Pt comfortable without complaints, pain controlled with current pain medication regimen.   Denies CP, SOB, N/V, numbness/tingling     Vital Signs Last 24 Hrs  T(C): 36.8 (04-07-22 @ 08:46), Max: 36.8 (04-07-22 @ 08:46)  T(F): 98.2 (04-07-22 @ 08:46), Max: 98.2 (04-07-22 @ 08:46)  HR: 67 (04-07-22 @ 08:46) (62 - 67)  BP: 93/54 (04-07-22 @ 08:46) (93/54 - 112/66)  BP(mean): --  RR: 17 (04-07-22 @ 08:46) (16 - 17)  SpO2: 97% (04-07-22 @ 08:46) (95% - 97%)  AVSS    Objective:    Physical Exam:  General: Pt Alert and oriented, NAD  Right knee aquacel DSG C/D/I  Pulses: +2 pedal ppulses, wwp toes, cap refill less than 3 seconds  Sensation: silt intact  Motor: EHL/FHL/TA/GS- firing        Plan of Care:  A/P: 71yMale POD#1 s/p Right TKA  - afebrile, nontoxic apperance  - Pain Control- tylenol 650mg PO Q6h, toradol 15mg IV Q6h x4 doses, Oxycodone 5-10mg PO Q3h prn moderate to severe pain, DIlaudid 0.5mg Q4h prn breakthrough pain    - DVT ppx: scds  - PT, WBS: WBAT  - bowel regimen, Is use  - Dispo- home pending pt clearance    Ortho Pager [phone]
Ortho Note    Pt comfortable without complaints, pain controlled  Denies CP, SOB, N/V, numbness/tingling     Vital Signs Last 24 Hrs  T(C): 36.7 (07 Apr 2022 16:00), Max: 36.8 (07 Apr 2022 08:46)  T(F): 98.1 (07 Apr 2022 16:00), Max: 98.2 (07 Apr 2022 08:46)  HR: 78 (07 Apr 2022 16:00) (62 - 78)  BP: 98/67 (07 Apr 2022 16:00) (93/54 - 129/87)  BP(mean): --  RR: 17 (07 Apr 2022 16:00) (16 - 18)  SpO2: 96% (07 Apr 2022 16:00) (95% - 97%)    VSS  General: A&Ox3, NAD  RLE: Aquacell DSG C/D/I  Pulses: Foot WWP; DP pulse 2+; Cap refill < 2 sec  Sensation: SILT distally and symmetric to contralateral extremity  Motor: TA/EHL/FHL/GS 5/5 and symmetric to contralateral extremity      Labs:                        12.2   10.33 )-----------( 197      ( 07 Apr 2022 07:08 )             35.8       04-07    133<L>  |  98  |  19  ----------------------------<  132<H>  4.4   |  24  |  0.92    Ca    9.0      07 Apr 2022 07:08            PT/INR - ( 07 Apr 2022 07:08 )   PT: 13.2 sec;   INR: 1.11          PTT - ( 06 Apr 2022 09:37 )  PTT:34.2 sec

## 2022-04-07 NOTE — DISCHARGE NOTE PROVIDER - NSDCCPCAREPLAN_GEN_ALL_CORE_FT
PRINCIPAL DISCHARGE DIAGNOSIS  Diagnosis: Osteoarthritis of right knee  Assessment and Plan of Treatment: s/p Right TKR      SECONDARY DISCHARGE DIAGNOSES  Diagnosis: Osteoarthritis of right knee  Assessment and Plan of Treatment:

## 2022-04-07 NOTE — PROGRESS NOTE ADULT - ASSESSMENT
A/P: 71yMale s/p R TKA on 04/06  - Stable  - Pain/Nausea Control  - Home meds  - AM labs stable  - DVT ppx:  QD  - WBS: WBAT RLE  - PT: HPT  - Plan for d/c today when clears      Ortho Pager [phone]

## 2022-04-07 NOTE — DISCHARGE NOTE NURSING/CASE MANAGEMENT/SOCIAL WORK - PATIENT PORTAL LINK FT
You can access the FollowMyHealth Patient Portal offered by [facility] by registering at the following website: [email or web address]. By joining [facility]’s FollowMyHealth portal, you will also be able to view your health information using other applications (apps) compatible with our system.

## 2022-04-12 ENCOUNTER — APPOINTMENT (OUTPATIENT)
Dept: ORTHOPEDIC SURGERY | Facility: CLINIC | Age: 72
End: 2022-04-12
Payer: MEDICARE

## 2022-04-12 ENCOUNTER — NON-APPOINTMENT (OUTPATIENT)
Age: 72
End: 2022-04-12

## 2022-04-12 PROBLEM — M19.90 UNSPECIFIED OSTEOARTHRITIS, UNSPECIFIED SITE: Chronic | Status: ACTIVE | Noted: 2022-04-05

## 2022-04-12 PROCEDURE — 73562 X-RAY EXAM OF KNEE 3: CPT | Mod: RT

## 2022-04-12 PROCEDURE — 99024 POSTOP FOLLOW-UP VISIT: CPT

## 2022-04-12 RX ORDER — OXYCODONE AND ACETAMINOPHEN 5; 325 MG/1; MG/1
5-325 TABLET ORAL
Qty: 40 | Refills: 0 | Status: ACTIVE | COMMUNITY
Start: 2022-04-12 | End: 1900-01-01

## 2022-04-12 RX ORDER — CELECOXIB 200 MG/1
200 CAPSULE ORAL
Qty: 30 | Refills: 0 | Status: ACTIVE | COMMUNITY
Start: 2022-04-12 | End: 1900-01-01

## 2022-04-12 RX ORDER — SENNOSIDES 8.6 MG TABLETS 8.6 MG/1
8.6 TABLET ORAL
Qty: 30 | Refills: 0 | Status: ACTIVE | COMMUNITY
Start: 2022-04-12 | End: 1900-01-01

## 2022-04-12 NOTE — DISCUSSION/SUMMARY
[de-identified] : Patient transition to outpatient physical therapy followup with me in 4 weeks' time.

## 2022-04-12 NOTE — HISTORY OF PRESENT ILLNESS
[de-identified] : Patient is status postop day 6 right knee replacement. He is complaining of a moderate amount of ecchymosis and swelling but overall has diminished pain and has excellent range of motion.

## 2022-04-12 NOTE — PHYSICAL EXAM
[de-identified] : Right knee wound is well-healed there is a moderate amount of ecchymosis. Especially in the area of the tourniquet. There is a small effusion in the knee but appropriate at this point. He is neurovascularly intact distally range of motion already is 0-120°. [de-identified] : Postop knee wave after water today. AP standing individual lateral sunrise view show well-positioned [employer]-type right knee prosthesis.

## 2022-04-18 DIAGNOSIS — M17.11 UNILATERAL PRIMARY OSTEOARTHRITIS, RIGHT KNEE: ICD-10-CM

## 2022-04-18 DIAGNOSIS — I10 ESSENTIAL (PRIMARY) HYPERTENSION: ICD-10-CM

## 2022-04-18 DIAGNOSIS — M25.761 OSTEOPHYTE, RIGHT KNEE: ICD-10-CM

## 2022-05-10 ENCOUNTER — APPOINTMENT (OUTPATIENT)
Dept: ORTHOPEDIC SURGERY | Facility: CLINIC | Age: 72
End: 2022-05-10
Payer: MEDICARE

## 2022-05-10 PROCEDURE — 99024 POSTOP FOLLOW-UP VISIT: CPT

## 2022-05-10 NOTE — HISTORY OF PRESENT ILLNESS
[de-identified] : Patient returns today he is 5 weeks status post right knee replacement continues to do his in-home physical therapy regimen.  He complains of anterior knee pain going up and down steps but otherwise believes he is progressing quite well.

## 2022-05-10 NOTE — DISCUSSION/SUMMARY
[de-identified] : Patient will continue with his in-home physical therapy regimen I have advised him to have him and his therapist focus on quadricep strengthening to help improve patellofemoral support and tracking with stair climbing follow-up in 6 weeks time.

## 2022-05-10 NOTE — PHYSICAL EXAM
[de-identified] : Right knee exam today range of motion is a remarkable 0 to 145 degrees.  He has minimal swelling there is some mild warmth about the knee but no obvious effusion neurovascular intact stable to AP stress varus valgus stress.

## 2022-06-21 ENCOUNTER — APPOINTMENT (OUTPATIENT)
Dept: ORTHOPEDIC SURGERY | Facility: CLINIC | Age: 72
End: 2022-06-21
Payer: MEDICARE

## 2022-06-21 PROCEDURE — 99024 POSTOP FOLLOW-UP VISIT: CPT

## 2022-06-21 RX ORDER — METHYLPREDNISOLONE 4 MG/1
4 TABLET ORAL
Qty: 1 | Refills: 0 | Status: ACTIVE | COMMUNITY
Start: 2022-06-21 | End: 1900-01-01

## 2022-06-21 NOTE — PHYSICAL EXAM
[de-identified] : Patient's right knee wound is well-healed there is minimal soft tissue swelling minimal warmth range of 0 to 130 degrees.

## 2022-06-21 NOTE — REASON FOR VISIT
[FreeTextEntry2] : Still c/o of Rt knee pain while walking downstair and at night time.  s/p Rt knee repalcement 4/6/2022

## 2022-06-21 NOTE — DISCUSSION/SUMMARY
[de-identified] : Patient's range of motion is excellent so I have encouraged him to discontinue formal physical therapy.  I have noted in the past that vigorous physical therapy prolongs patient's discomfort especially with sleeping.  And since the patient has excellent range of motion he does not need to persist there we will place him also on a Medrol Dosepak to help reduce some of his residual swelling and stiffness follow-up in 3 weeks time if not improved

## 2022-06-21 NOTE — HISTORY OF PRESENT ILLNESS
[de-identified] : Patient returns 10 weeks status post right knee replacement.  Patient states he is having significant pain still at night difficulty sleeping because he cannot find a comfortable position he is otherwise saying that he feels that the knee is improved in terms of range of motion.

## 2022-06-27 NOTE — PHYSICAL THERAPY INITIAL EVALUATION ADULT - LEVEL OF INDEPENDENCE: SIT/STAND, REHAB EVAL
Called and spoke to mom who states she called back for appt tomorrow for diaper rash  Mom reports she has been using desitin but it is no longer helping  She reports diaper rash only starts after bowel movement but she is not having diarrhea  Reviewed HT until appt including using wet paper towel instead of wipes, leaving RACHEL as much as possible and using thick layer of aquaphor for barrier   Mom plans to keep appointment tomorrow contact guard

## 2022-07-28 ENCOUNTER — APPOINTMENT (OUTPATIENT)
Dept: PHYSICAL MEDICINE AND REHAB | Facility: CLINIC | Age: 72
End: 2022-07-28

## 2022-08-29 RX ORDER — METHYLPREDNISOLONE 4 MG/1
4 TABLET ORAL
Qty: 1 | Refills: 3 | Status: DISCONTINUED | COMMUNITY
Start: 2021-12-23 | End: 2022-08-29

## 2022-08-30 ENCOUNTER — APPOINTMENT (OUTPATIENT)
Dept: NEUROSURGERY | Facility: CLINIC | Age: 72
End: 2022-08-30

## 2022-08-30 VITALS
OXYGEN SATURATION: 98 % | HEIGHT: 68 IN | HEART RATE: 71 BPM | TEMPERATURE: 97.7 F | DIASTOLIC BLOOD PRESSURE: 88 MMHG | BODY MASS INDEX: 25.76 KG/M2 | SYSTOLIC BLOOD PRESSURE: 143 MMHG | WEIGHT: 170 LBS

## 2022-08-30 DIAGNOSIS — G89.29 OTHER CHRONIC PAIN: ICD-10-CM

## 2022-08-30 DIAGNOSIS — M19.90 UNSPECIFIED OSTEOARTHRITIS, UNSPECIFIED SITE: ICD-10-CM

## 2022-08-30 DIAGNOSIS — I10 ESSENTIAL (PRIMARY) HYPERTENSION: ICD-10-CM

## 2022-08-30 DIAGNOSIS — E78.5 HYPERLIPIDEMIA, UNSPECIFIED: ICD-10-CM

## 2022-08-30 PROCEDURE — 99203 OFFICE O/P NEW LOW 30 MIN: CPT

## 2022-08-30 NOTE — ASSESSMENT
[FreeTextEntry1] : Mr Magallanes has  chronic cervical pain that increases with head rotation, flexion and extension. In addition he reports tingling in 2nd and 3rd left fingers. MRI  shows a C5-6 disc herniation and also severe facet degeneration which may be the source of his pain.I will have him do xrays to rule out instability and a CT scan to rule out facet osteoarthritis and degeneration.\par

## 2022-08-30 NOTE — PHYSICAL EXAM
[Oriented To Time, Place, And Person] : oriented to person, place, and time [Person] : oriented to person [Cranial Nerves Optic (II)] : visual acuity intact bilaterally,  pupils equal round and reactive to light [Cranial Nerves Oculomotor (III)] : extraocular motion intact [Cranial Nerves Trigeminal (V)] : facial sensation intact symmetrically [Cranial Nerves Facial (VII)] : face symmetrical [Cranial Nerves Vestibulocochlear (VIII)] : hearing was intact bilaterally [Cranial Nerves Glossopharyngeal (IX)] : tongue and palate midline [Cranial Nerves Accessory (XI - Cranial And Spinal)] : head turning and shoulder shrug symmetric [Cranial Nerves Hypoglossal (XII)] : there was no tongue deviation with protrusion [Motor Tone] : muscle tone was normal in all four extremities [Motor Strength] : muscle strength was normal in all four extremities [Involuntary Movements] : no involuntary movements were seen [No Muscle Atrophy] : normal bulk in all four extremities [5] : S1 toe walking 5/5 [Spurling's - Opposite Side] : Positive Spurling's on opposite side [Spurling's Same Side] : Positive Spurling's on same side [Intact] : all sensory within normal limits bilaterally

## 2022-08-30 NOTE — DATA REVIEWED
[de-identified] : Cervical (07/2022) c3-c4, c5-c6, C6-c7 mild b/l foraminal stenosis. Mild canal stenosis c5-c6. Retolisthesis c3 on c4. anterlisthesis c4 on c5 and c7 on T1.

## 2022-08-30 NOTE — HISTORY OF PRESENT ILLNESS
[> 3 months] : more  than 3 months [FreeTextEntry1] : Neck pain.  [de-identified] : Patient is a 72 year old male with past med hx of lumbar cord compression (4 years ago), HTN, HLD, OA. Neck pain over 3 months. Pain is 3-4/10. Pain increases to 10/10 when turning neck Flexion and extension. Radiates to left front of neck and radiates to clavicle. Tingling in 2nd and third finger. Denies numbness, weakness. Neck pain interferes with balance. Patient has taken tylenol which help for few hours. Patient has not done any physical therapy or injections.Denies urinary/fecal incontinence. \par \par Patient also complains of lower back pain with tingling, weakness and numbness in b/l feet. Patient is able to walk over an hour. At this time he can tolerate lumbar pain but not the neck pain.

## 2022-09-08 ENCOUNTER — APPOINTMENT (OUTPATIENT)
Dept: ORTHOPEDIC SURGERY | Facility: CLINIC | Age: 72
End: 2022-09-08

## 2022-09-08 DIAGNOSIS — M62.838 OTHER MUSCLE SPASM: ICD-10-CM

## 2022-09-08 DIAGNOSIS — M54.12 RADICULOPATHY, CERVICAL REGION: ICD-10-CM

## 2022-09-08 DIAGNOSIS — M54.2 CERVICALGIA: ICD-10-CM

## 2022-09-08 PROCEDURE — 72050 X-RAY EXAM NECK SPINE 4/5VWS: CPT

## 2022-09-08 PROCEDURE — 99204 OFFICE O/P NEW MOD 45 MIN: CPT

## 2022-09-08 RX ORDER — GABAPENTIN 100 MG/1
100 CAPSULE ORAL
Qty: 60 | Refills: 2 | Status: ACTIVE | COMMUNITY
Start: 2022-09-08 | End: 1900-01-01
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
## 2022-09-08 NOTE — IMAGING
[de-identified] : CSPINE\par Inspection: No rash or ecchymosis\par Palpation: L paraspinal TTP and spasm. L trap TTP\par ROM: diminished all planes\par Strength: 5/5 bilateral deltoid, biceps, triceps, wrist flexors, wrist extensors, , abductors\par Sensation: Sensation present to light touch bilateral C5-T1 distributions\par Reflexes: Negative Alfred's bilaterally. Dec. DTRs b/l bicep and BR\par Able to tandem gait [Straightening consistent with spasm] : Straightening consistent with spasm [Facet arthropathy] : Facet arthropathy [Disc space narrowing] : Disc space narrowing
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
## 2022-09-20 ENCOUNTER — APPOINTMENT (OUTPATIENT)
Dept: NEUROSURGERY | Facility: CLINIC | Age: 72
End: 2022-09-20

## 2022-10-20 ENCOUNTER — APPOINTMENT (OUTPATIENT)
Dept: ORTHOPEDIC SURGERY | Facility: CLINIC | Age: 72
End: 2022-10-20

## 2022-10-27 ENCOUNTER — APPOINTMENT (OUTPATIENT)
Dept: NEUROSURGERY | Facility: CLINIC | Age: 72
End: 2022-10-27

## 2022-10-27 VITALS
HEART RATE: 64 BPM | BODY MASS INDEX: 25.76 KG/M2 | WEIGHT: 170 LBS | SYSTOLIC BLOOD PRESSURE: 124 MMHG | TEMPERATURE: 98 F | HEIGHT: 68 IN | OXYGEN SATURATION: 98 % | DIASTOLIC BLOOD PRESSURE: 74 MMHG

## 2022-10-27 DIAGNOSIS — M48.02 SPINAL STENOSIS, CERVICAL REGION: ICD-10-CM

## 2022-10-27 DIAGNOSIS — M54.12 RADICULOPATHY, CERVICAL REGION: ICD-10-CM

## 2022-10-27 DIAGNOSIS — M54.2 CERVICALGIA: ICD-10-CM

## 2022-10-27 PROCEDURE — 99213 OFFICE O/P EST LOW 20 MIN: CPT

## 2022-10-27 RX ORDER — CYCLOBENZAPRINE HYDROCHLORIDE 10 MG/1
10 TABLET, FILM COATED ORAL 3 TIMES DAILY
Qty: 90 | Refills: 1 | Status: ACTIVE | COMMUNITY
Start: 2022-10-27 | End: 1900-01-01

## 2022-10-27 NOTE — PHYSICAL EXAM
[Oriented To Time, Place, And Person] : oriented to person, place, and time [Person] : oriented to person [Cranial Nerves Optic (II)] : visual acuity intact bilaterally,  pupils equal round and reactive to light [Cranial Nerves Oculomotor (III)] : extraocular motion intact [Cranial Nerves Trigeminal (V)] : facial sensation intact symmetrically [Cranial Nerves Facial (VII)] : face symmetrical [Cranial Nerves Vestibulocochlear (VIII)] : hearing was intact bilaterally [Cranial Nerves Glossopharyngeal (IX)] : tongue and palate midline [Cranial Nerves Accessory (XI - Cranial And Spinal)] : head turning and shoulder shrug symmetric [Cranial Nerves Hypoglossal (XII)] : there was no tongue deviation with protrusion [Motor Tone] : muscle tone was normal in all four extremities [Motor Strength] : muscle strength was normal in all four extremities [Involuntary Movements] : no involuntary movements were seen [No Muscle Atrophy] : normal bulk in all four extremities [Spurling's - Opposite Side] : Positive Spurling's on opposite side [Spurling's Same Side] : Positive Spurling's on same side [Limited Balance] : the patient's balance was impaired [Normal] : normal [No Deficits] : no sensory deficits [Pain / Temp Decrease Sensory Level At Shoulders - Left Only] : C5 sensory impairment [Pain / Temp Decrease - Root / Radicular Distribution] : C6 sensory impairment [5] : 5/5 Finger Flexors (C8)

## 2022-10-28 NOTE — HISTORY OF PRESENT ILLNESS
[> 3 months] : more  than 3 months [FreeTextEntry1] : Neck pain.  [de-identified] : 10/27/22: Mr. Magallanes is a 73 y/o, male, here for f/u to review CT and xray cervical spine. He reports since last office visit his neck pain remains unresolved. His pain radiates to left side of neck and occasionally down the arm. He has associated numbness and tingling of L arm/hand. He reports his neck pain is worsened by cough along with lateral rotation of neck. He has LOB. He denies any urine/fecal incontinence. He has not done any recent physical therapy. \par \par 08/22: Patient is a 72 year old male with past med hx of lumbar cord compression (4 years ago), HTN, HLD, OA. Neck pain over 3 months. Pain is 3-4/10. Pain increases to 10/10 when turning neck Flexion and extension. Radiates to left front of neck and radiates to clavicle. Tingling in 2nd and third finger. Denies numbness, weakness. Neck pain interferes with balance. Patient has taken tylenol which help for few hours. Patient has not done any physical therapy or injections.Denies urinary/fecal incontinence. \par \par Patient also complains of lower back pain with tingling, weakness and numbness in b/l feet. Patient is able to walk over an hour. At this time he can tolerate lumbar pain but not the neck pain.

## 2022-10-28 NOTE — REVIEW OF SYSTEMS
[As Noted in HPI] : as noted in HPI [Numbness] : numbness [Tingling] : tingling [Abnormal Sensation] : an abnormal sensation [Negative] : Heme/Lymph [de-identified] : Neck pain

## 2022-10-28 NOTE — ASSESSMENT
[FreeTextEntry1] : Mr. Magallanes is a 71 y/o, male cervical spondylosis and neck pain. CT and xray cervical spine reviewed with pt in detail. At this time pt would like to try cervical block by pain management and physical therapy, referrals provided. Rx for cyclobenzaprine provided to improve muscle spasms. Pt will f/u.

## 2023-08-17 NOTE — PHYSICAL THERAPY INITIAL EVALUATION ADULT - SKIN INTEGRITY
dressing clean/dry/intact/surgical incision Valtrex Counseling: I discussed with the patient the risks of valacyclovir including but not limited to kidney damage, nausea, vomiting and severe allergy.  The patient understands that if the infection seems to be worsening or is not improving, they are to call.

## 2024-04-23 ENCOUNTER — APPOINTMENT (OUTPATIENT)
Dept: ORTHOPEDIC SURGERY | Facility: CLINIC | Age: 74
End: 2024-04-23
Payer: MEDICARE

## 2024-04-23 DIAGNOSIS — M25.562 PAIN IN LEFT KNEE: ICD-10-CM

## 2024-04-23 PROCEDURE — 99214 OFFICE O/P EST MOD 30 MIN: CPT

## 2024-04-23 NOTE — DISCUSSION/SUMMARY
[de-identified] : Patient I discussed the possible underlying etiology of his reviewed the pertinent anatomy of the knee it seems like the patient either had exacerbation of his mild underlying osteoarthritis or a torn medial meniscus.  The fact that the patient is currently asymptomatic and that we will render no treatment recommendations simple observation.  If the patient notices return of the symptoms on a regular basis and MRI and/or radiographs of the knee may be warranted.  For now we will simply observe his symptoms.  Today's consultation lasted 30 minutes.  Recent abrupt symptoms.  Patient denied

## 2024-04-23 NOTE — REASON FOR VISIT
[Initial Visit] : an initial visit for [Knee Pain] : knee pain [FreeTextEntry2] : LAST SEEN IN 2021, HE IS HERE FOR LEFT KNEE PAIN. NO INJURY/TRAUMA

## 2024-04-23 NOTE — PHYSICAL EXAM
[de-identified] : Left knee has minimal warmth there is no tenderness to palpation medial joint line where the patient indicates she had previous pain range of motion 0 to 125 degrees knee stable extra stress valgus stress in full extension there is minimal warmth minimal soft tissue swelling minimal to 0 effusion noted. [de-identified] : Prior radiographs in 2022 were reviewed showing moderate degenerative arthritis of the medial compartment of the left knee.

## 2024-04-23 NOTE — HISTORY OF PRESENT ILLNESS
[de-identified] : First-time visit in nearly 3 years for this patient.  He is status post successful placement he states that he had a sudden onset of medial left knee pain over the past weekend was very sharp and caused him some significant discomfort he was getting out of a car it has since completely disappeared but he is here to have the knee evaluated nonetheless.

## 2024-04-30 ENCOUNTER — APPOINTMENT (OUTPATIENT)
Dept: ORTHOPEDIC SURGERY | Facility: CLINIC | Age: 74
End: 2024-04-30
Payer: MEDICARE

## 2024-04-30 DIAGNOSIS — M17.12 UNILATERAL PRIMARY OSTEOARTHRITIS, LEFT KNEE: ICD-10-CM

## 2024-04-30 PROCEDURE — 99214 OFFICE O/P EST MOD 30 MIN: CPT

## 2024-04-30 RX ORDER — METHYLPREDNISOLONE 4 MG/1
4 TABLET ORAL
Qty: 1 | Refills: 1 | Status: ACTIVE | COMMUNITY
Start: 2024-04-30 | End: 1900-01-01

## 2024-04-30 NOTE — HISTORY OF PRESENT ILLNESS
[de-identified] : Patient returns today he has had some severe worsening of the left knee symptoms he was seen 1 week ago at that point he was fairly asymptomatic after twisting the knee.  Patient has having difficulty ambulating he himself prescribed an MRI the results are available for review which indicate severe arthritis of the patellofemoral articulation as well as medial compartment as well as a complex tear of the medial meniscus.

## 2024-04-30 NOTE — DISCUSSION/SUMMARY
[de-identified] : Patient I discussed at length the findings of the MRI I believe that his main source of discomfort is the severe arthritis.  At this point I told the patient I believe that she is considered due to his age and past findings on the MRI for knee replacement surgery.  A temporizing measure replacement patient on a Medrol Dosepak we ordered a cortisone injection because injections today with me in 3 months blacked out for knee replacement surgery patient was since his level of discomfort and also the ability for him to undergo knee replacement surgery anytime soon the patient is unable to have surgery anytime soon and sees no improvement with the Medrol Dosepak cortisone injection may be of value in the next week to 10 days.  Patient had undergone successful right knee replacement he is aware of the reasonable risk and benefits of the procedure including potential complications.  Will have him evaluated 7 to 10 days if not improved.  This consultation lasted 30 minutes.

## 2024-04-30 NOTE — PHYSICAL EXAM
[de-identified] : Left knee has definite medial joint line tenderness there is warmth about the soft tissue but no effusion mild varus pronation range of motion 5 to 110 degrees.  There is small effusion noted.

## 2024-06-11 ENCOUNTER — TRANSCRIPTION ENCOUNTER (OUTPATIENT)
Age: 74
End: 2024-06-11

## 2024-06-11 VITALS
OXYGEN SATURATION: 96 % | TEMPERATURE: 98 F | DIASTOLIC BLOOD PRESSURE: 75 MMHG | RESPIRATION RATE: 16 BRPM | WEIGHT: 176.81 LBS | HEIGHT: 68 IN | HEART RATE: 60 BPM | SYSTOLIC BLOOD PRESSURE: 120 MMHG

## 2024-06-11 RX ORDER — POVIDONE-IODINE 5 %
1 AEROSOL (ML) TOPICAL ONCE
Refills: 0 | Status: COMPLETED | OUTPATIENT
Start: 2024-06-12 | End: 2024-06-12

## 2024-06-11 NOTE — ASU PATIENT PROFILE, ADULT - REASON FOR ADMISSION, PROFILE
Suresh Powers is enrolled/participating in the retail pharmacy Blood Pressure Goals Achievement Program (BPGAP).  Suresh Powers was evaluated at [facility] on May 24, 2018 at which time his blood pressure was:    BP Readings from Last 3 Encounters:   05/24/18 128/72   05/10/18 136/80   05/05/18 134/80     Reviewed lifestyle modifications for blood pressure control and reduction: including making healthy food choices, managing weight, getting regular exercise, smoking cessation, reducing alcohol consumption, monitoring blood pressure regularly.     Suresh Powers is not experiencing symptoms.    Follow-Up: BP is at goal of < 140/90mmHg (patient 18+ years of age with or without diabetes).  Recommended follow-up at pharmacy in 6 months.     Recommendation to Provider: none    Suresh Powers was evaluated for enrollment into the PGEN study today.    Patient eligible for enrollment:  No  Patient interested in enrollment:  No    Completed by: Lucas Mcknight    Thank You   Adam Anderson  [facility]    
Pain in left knee

## 2024-06-11 NOTE — ASU PATIENT PROFILE, ADULT - FALL HARM RISK - HARM RISK INTERVENTIONS

## 2024-06-11 NOTE — H&P ADULT - NSICDXPASTSURGICALHX_GEN_ALL_CORE_FT
PAST SURGICAL HISTORY:  H/O cystoscopy kdiney stones    H/O shoulder surgery left    History of surgery left quadriceps tear

## 2024-06-11 NOTE — H&P ADULT - HISTORY OF PRESENT ILLNESS
72 y/o male presents with c/o left knee pain x  Patient elects to undergo Left TKA with Dr. Alcazar  72 y/o male presents with c/o left knee pain despite conservative therapies for his symptoms. Takes tylenol as needed for his pain. Denies history of blood clots or use of assistive walking devices.   Of note had prior surgery to left knee ~10 years ago, pt unsure of procedure states he had "muscle tear."  Patient elects to undergo Left Total knee replacement with Dr. Alcazar.

## 2024-06-11 NOTE — ASU PREOP CHECKLIST - CHLOROHEXIDINE WASH IN ASU
12-Jun-2024 06:31 Restart Xarelto 20 mg once daily  Cefpodoxime 200 mg twice daily for 1 week  Follow up with your doctor within the next 1-3 days for further embolic workup as we discussed  Return to the ED immediately for new or worsening symptoms as we discussed.        ABDOMINAL PAIN - General Information    Abdominal Pain    WHAT YOU NEED TO KNOW:    What do I need to know about abdominal pain? Abdominal pain may be felt anywhere between the bottom of your rib cage and your groin. Acute pain usually lasts less than 3 months. Chronic pain lasts longer than 3 months. Your pain may be sharp or dull. The pain may stay in the same place or move around. You may have the pain all the time, or it may come and go. Depending on the cause, you may also have nausea, vomiting, fever, or diarrhea.  Abdominal Organs    What causes abdominal pain? The cause may not be found. The following are common causes:    Overeating, gas pains, or food poisoning    Constipation or diarrhea    An injury    Appendicitis, a hernia, or an ulcer    Infection or a blockage    A liver, gallbladder, or kidney condition  How is the cause of abdominal pain diagnosed? Your healthcare provider will check your abdomen. He or she will ask where your pain is and when it started. Tell him or her if the pain wakes you or stops you from doing your daily activities. Describe anything that makes the pain better or worse. You may also need any of the following:    Blood, urine, or bowel movement samples may be tested for signs of an infection, disease, or injury.    X-ray pictures of your abdomen may show an injury or other cause of the pain.  How is abdominal pain treated?    Prescription pain medicine may be given. Ask your healthcare provider how to take this medicine safely. Some prescription pain medicines contain acetaminophen. Do not take other medicines that contain acetaminophen without talking to your healthcare provider. Too much acetaminophen may cause liver damage. Prescription pain medicine may cause constipation. Ask your healthcare provider how to prevent or treat constipation.    Medicines may be given to calm your stomach or prevent vomiting.    Relaxation therapy may be used along with pain medicine.    Surgery may be needed, depending on the cause.  What can I do to manage or prevent abdominal pain?    Apply heat on your abdomen for 20 to 30 minutes every 2 hours for as many days as directed. Heat helps decrease pain and muscle spasms.    Make changes to the foods you eat, if needed. Do not eat foods that cause abdominal pain or other symptoms. Eat small meals more often. The following changes may also help:  Eat more high-fiber foods if you are constipated. High-fiber foods include fruits, vegetables, whole-grain foods, and legumes such as sandoval beans.        Do not eat foods that cause gas if you have bloating. Examples include broccoli, cabbage, beans, and carbonated drinks.    Do not eat foods or drinks that contain sorbitol or fructose if you have diarrhea and bloating. Some examples are fruit juices, candy, jelly, and sugar-free gum.    Do not eat high-fat foods. Examples include fried foods, cheeseburgers, hot dogs, and desserts.    Make changes to the liquids you drink, if needed. Do not drink liquids that cause pain or make it worse, such as orange juice. Drink liquids throughout the day to stay hydrated. The following changes may also help:  Drink more liquids to prevent dehydration from diarrhea or vomiting. Ask your healthcare provider how much liquid to drink each day and which liquids are best for you.    Limit or do not have caffeine. Caffeine may make symptoms such as heartburn or nausea worse.    Limit or do not drink alcohol. Alcohol can make your abdominal pain worse. Ask your healthcare provider if it is okay for you to drink alcohol. Also ask how much is okay for you to drink. A drink of alcohol is 12 ounces of beer, ½ ounce of liquor, or 5 ounces of wine.    Keep a diary of your abdominal pain. A diary may help your healthcare provider learn what is causing your pain. Include when the pain happens, how long it lasts, and what the pain feels like. Write down any other symptoms you have with abdominal pain. Also write down what you eat, and any symptoms you have after you eat.    Manage stress. Stress may cause abdominal pain. Your healthcare provider may recommend relaxation techniques and deep breathing exercises to help decrease your stress. Your healthcare provider may recommend you talk to someone about your stress or anxiety, such as a counselor or a friend. Get plenty of sleep. Exercise regularly.  Black Family Walking for Exercise      Do not smoke. Nicotine and other chemicals in cigarettes can damage your esophagus and stomach. Ask your healthcare provider for information if you currently smoke and need help to quit. E-cigarettes or smokeless tobacco still contain nicotine. Talk to your healthcare provider before you use these products.  Call your local emergency number (911 in the [place]) if:    You have chest pain or shortness of breath.    When should I seek immediate care?    You have pulsing pain in your upper abdomen or lower back that suddenly becomes constant.    Your pain is in the right lower abdominal area and worsens with movement.    You have a fever over 100.4°F (38°C) or shaking chills.    You are vomiting and cannot keep food or liquids down.    Your pain does not improve or gets worse over the next 8 to 12 hours.    You see blood in your vomit or bowel movements, or they look black and tarry.    Your skin or the whites of your eyes turn yellow.    You are a woman and have a large amount of vaginal bleeding that is not your monthly period.  When should I call my doctor?    You have pain in your lower back.    You are a man and have pain in your testicles.    You have pain when you urinate.    You have questions or concerns about your condition or care.  CARE AGREEMENT:    You have the right to help plan your care. Learn about your health condition and how it may be treated. Discuss treatment options with your healthcare providers to decide what care you want to receive. You always have the right to refuse treatment.    © [employer] 1973, 2023    	  back to top            © [employer] 1973, 2023

## 2024-06-11 NOTE — H&P ADULT - PROBLEM SELECTOR PLAN 1
Admit to Orthopedic Service   For elective Left TKA with Dr. Alcazar   Medically cleared and optimized for surgery by Dr. Kennedy

## 2024-06-11 NOTE — H&P ADULT - NSHPLABSRESULTS_GEN_ALL_CORE
Preop CBC, BMP, PT/INR, PTT within normal range  H/H 13.8/45.9  Cr 0.9  UA negative   Preop CXR within normal limits, reviewed per medical clearance   Preop EKG HR 67 BPM, RBBB, and reviewed per medical clearance Preop CBC, BMP, PT/INR, PTT within normal range  H/H 13.8/45.9  Cr 0.9  UA negative   Preop CXR within normal limits, reviewed per medical clearance   Preop EKG HR 67 BPM, RBBB, and reviewed per medical clearance  Povidone iodine nasal swab to be given day of surgery

## 2024-06-11 NOTE — ASU PATIENT PROFILE, ADULT - NSICDXPASTSURGICALHX_GEN_ALL_CORE_FT
PAST SURGICAL HISTORY:  H/O cystoscopy kdiney stones    H/O shoulder surgery left    History of surgery left quadriceps tear     PAST SURGICAL HISTORY:  H/O cystoscopy kdiney stones    H/O knee surgery rt knee    H/O shoulder surgery left    History of surgery left quadriceps tear

## 2024-06-11 NOTE — H&P ADULT - NSHPPHYSICALEXAM_GEN_ALL_CORE
Gen: 74 y/o male, NAD  MSK: decreased ROM 2/2 pain at the left knee       Remainder of exam per medical clearance note Gen: 74 y/o male, NAD  MSK: Skin warm and well perfused. Well healed surgical scar to left knee. DP pulse palpable left lower extremity. Sensation intact and equal bilateral lower extremities. EHL/TA/GS/FHL 5/5 bilateral lower extremities. decreased ROM 2/2 pain at the left knee       Remainder of exam per medical clearance note

## 2024-06-12 ENCOUNTER — INPATIENT (INPATIENT)
Facility: HOSPITAL | Age: 74
LOS: 2 days | Discharge: NURSING, REHAB OR LONG-TERM CARE | End: 2024-06-15
Attending: SPECIALIST | Admitting: SPECIALIST
Payer: MEDICARE

## 2024-06-12 ENCOUNTER — APPOINTMENT (OUTPATIENT)
Dept: ORTHOPEDIC SURGERY | Facility: HOSPITAL | Age: 74
End: 2024-06-12
Payer: MEDICARE

## 2024-06-12 ENCOUNTER — RESULT REVIEW (OUTPATIENT)
Age: 74
End: 2024-06-12

## 2024-06-12 DIAGNOSIS — I10 ESSENTIAL (PRIMARY) HYPERTENSION: ICD-10-CM

## 2024-06-12 DIAGNOSIS — M17.12 UNILATERAL PRIMARY OSTEOARTHRITIS, LEFT KNEE: ICD-10-CM

## 2024-06-12 DIAGNOSIS — Z98.890 OTHER SPECIFIED POSTPROCEDURAL STATES: Chronic | ICD-10-CM

## 2024-06-12 PROCEDURE — 73560 X-RAY EXAM OF KNEE 1 OR 2: CPT | Mod: 26,LT

## 2024-06-12 PROCEDURE — 27447 TOTAL KNEE ARTHROPLASTY: CPT | Mod: LT

## 2024-06-12 PROCEDURE — 27447 TOTAL KNEE ARTHROPLASTY: CPT | Mod: AS,LT

## 2024-06-12 DEVICE — PATELLA 35MM JOURNEY 7.5 RND RSRF: Type: IMPLANTABLE DEVICE | Site: LEFT | Status: FUNCTIONAL

## 2024-06-12 DEVICE — IMPLANTABLE DEVICE: Type: IMPLANTABLE DEVICE | Site: LEFT | Status: FUNCTIONAL

## 2024-06-12 DEVICE — PIN MIS RIMMED 3.2X45MM STRL: Type: IMPLANTABLE DEVICE | Site: LEFT | Status: FUNCTIONAL

## 2024-06-12 DEVICE — CEMENT PALACOS R: Type: IMPLANTABLE DEVICE | Site: LEFT | Status: FUNCTIONAL

## 2024-06-12 DEVICE — FEM COMP JRNY II BCS BICRUCIATE LT SZ 6: Type: IMPLANTABLE DEVICE | Site: LEFT | Status: FUNCTIONAL

## 2024-06-12 DEVICE — TIB JOURNEY LFT SZ 6: Type: IMPLANTABLE DEVICE | Site: LEFT | Status: FUNCTIONAL

## 2024-06-12 DEVICE — PIN TROCAR GEN 1/8 X 3IN: Type: IMPLANTABLE DEVICE | Site: LEFT | Status: FUNCTIONAL

## 2024-06-12 RX ORDER — ASPIRIN/CALCIUM CARB/MAGNESIUM 324 MG
325 TABLET ORAL DAILY
Refills: 0 | Status: ACTIVE | OUTPATIENT
Start: 2024-06-13 | End: 2025-05-12

## 2024-06-12 RX ORDER — METOPROLOL TARTRATE 50 MG
50 TABLET ORAL DAILY
Refills: 0 | Status: ACTIVE | OUTPATIENT
Start: 2024-06-12 | End: 2025-05-11

## 2024-06-12 RX ORDER — SODIUM CHLORIDE 9 MG/ML
1000 INJECTION, SOLUTION INTRAVENOUS
Refills: 0 | Status: ACTIVE | OUTPATIENT
Start: 2024-06-13 | End: 2025-05-12

## 2024-06-12 RX ORDER — APREPITANT 80 MG/1
40 CAPSULE ORAL ONCE
Refills: 0 | Status: COMPLETED | OUTPATIENT
Start: 2024-06-12 | End: 2024-06-12

## 2024-06-12 RX ORDER — OXYCODONE HYDROCHLORIDE 5 MG/1
5 TABLET ORAL EVERY 4 HOURS
Refills: 0 | Status: ACTIVE | OUTPATIENT
Start: 2024-06-12 | End: 2024-06-19

## 2024-06-12 RX ORDER — ONDANSETRON 8 MG/1
4 TABLET, FILM COATED ORAL EVERY 6 HOURS
Refills: 0 | Status: ACTIVE | OUTPATIENT
Start: 2024-06-12 | End: 2025-05-11

## 2024-06-12 RX ORDER — CEFAZOLIN SODIUM 1 G
2000 VIAL (EA) INJECTION EVERY 8 HOURS
Refills: 0 | Status: COMPLETED | OUTPATIENT
Start: 2024-06-12 | End: 2024-06-12

## 2024-06-12 RX ORDER — AMLODIPINE BESYLATE 2.5 MG/1
5 TABLET ORAL DAILY
Refills: 0 | Status: ACTIVE | OUTPATIENT
Start: 2024-06-12 | End: 2025-05-11

## 2024-06-12 RX ORDER — CELECOXIB 200 MG/1
200 CAPSULE ORAL EVERY 12 HOURS
Refills: 0 | Status: ACTIVE | OUTPATIENT
Start: 2024-06-13 | End: 2025-05-12

## 2024-06-12 RX ORDER — ASPIRIN/CALCIUM CARB/MAGNESIUM 324 MG
325 TABLET ORAL DAILY
Refills: 0 | Status: DISCONTINUED | OUTPATIENT
Start: 2024-06-12 | End: 2024-06-12

## 2024-06-12 RX ORDER — HYDROMORPHONE HYDROCHLORIDE 2 MG/ML
0.5 INJECTION INTRAMUSCULAR; INTRAVENOUS; SUBCUTANEOUS
Refills: 0 | Status: DISCONTINUED | OUTPATIENT
Start: 2024-06-12 | End: 2024-06-12

## 2024-06-12 RX ORDER — ACETAMINOPHEN 500 MG
1000 TABLET ORAL ONCE
Refills: 0 | Status: COMPLETED | OUTPATIENT
Start: 2024-06-12 | End: 2024-06-12

## 2024-06-12 RX ORDER — CHLORHEXIDINE GLUCONATE 213 G/1000ML
1 SOLUTION TOPICAL ONCE
Refills: 0 | Status: COMPLETED | OUTPATIENT
Start: 2024-06-12 | End: 2024-06-12

## 2024-06-12 RX ORDER — ACETAMINOPHEN 500 MG
1000 TABLET ORAL EVERY 8 HOURS
Refills: 0 | Status: ACTIVE | OUTPATIENT
Start: 2024-06-12 | End: 2025-05-11

## 2024-06-12 RX ORDER — OXYCODONE HYDROCHLORIDE 5 MG/1
5 TABLET ORAL
Refills: 0 | Status: DISCONTINUED | OUTPATIENT
Start: 2024-06-12 | End: 2024-06-12

## 2024-06-12 RX ORDER — POLYETHYLENE GLYCOL 3350 17 G/17G
17 POWDER, FOR SOLUTION ORAL AT BEDTIME
Refills: 0 | Status: ACTIVE | OUTPATIENT
Start: 2024-06-12 | End: 2025-05-11

## 2024-06-12 RX ORDER — HYDROMORPHONE HYDROCHLORIDE 2 MG/ML
0.5 INJECTION INTRAMUSCULAR; INTRAVENOUS; SUBCUTANEOUS ONCE
Refills: 0 | Status: ACTIVE | OUTPATIENT
Start: 2024-06-12

## 2024-06-12 RX ORDER — MAGNESIUM HYDROXIDE 400 MG/1
30 TABLET, CHEWABLE ORAL DAILY
Refills: 0 | Status: ACTIVE | OUTPATIENT
Start: 2024-06-12 | End: 2025-05-11

## 2024-06-12 RX ORDER — SENNA PLUS 8.6 MG/1
2 TABLET ORAL AT BEDTIME
Refills: 0 | Status: ACTIVE | OUTPATIENT
Start: 2024-06-12 | End: 2025-05-11

## 2024-06-12 RX ORDER — OXYCODONE HYDROCHLORIDE 5 MG/1
10 TABLET ORAL EVERY 4 HOURS
Refills: 0 | Status: ACTIVE | OUTPATIENT
Start: 2024-06-12 | End: 2024-06-19

## 2024-06-12 RX ADMIN — SENNA PLUS 2 TABLET(S): 8.6 TABLET ORAL at 21:53

## 2024-06-12 RX ADMIN — POLYETHYLENE GLYCOL 3350 17 GRAM(S): 17 POWDER, FOR SOLUTION ORAL at 21:54

## 2024-06-12 RX ADMIN — HYDROMORPHONE HYDROCHLORIDE 0.5 MILLIGRAM(S): 2 INJECTION INTRAMUSCULAR; INTRAVENOUS; SUBCUTANEOUS at 12:14

## 2024-06-12 RX ADMIN — Medication 100 MILLIGRAM(S): at 22:00

## 2024-06-12 RX ADMIN — Medication 1000 MILLIGRAM(S): at 22:54

## 2024-06-12 RX ADMIN — HYDROMORPHONE HYDROCHLORIDE 0.5 MILLIGRAM(S): 2 INJECTION INTRAMUSCULAR; INTRAVENOUS; SUBCUTANEOUS at 12:41

## 2024-06-12 RX ADMIN — CHLORHEXIDINE GLUCONATE 1 APPLICATION(S): 213 SOLUTION TOPICAL at 06:58

## 2024-06-12 RX ADMIN — Medication 1000 MILLIGRAM(S): at 21:54

## 2024-06-12 RX ADMIN — Medication 100 MILLIGRAM(S): at 14:44

## 2024-06-12 RX ADMIN — Medication 1000 MILLIGRAM(S): at 15:38

## 2024-06-12 RX ADMIN — APREPITANT 40 MILLIGRAM(S): 80 CAPSULE ORAL at 06:59

## 2024-06-12 RX ADMIN — Medication 1000 MILLIGRAM(S): at 06:58

## 2024-06-12 RX ADMIN — OXYCODONE HYDROCHLORIDE 5 MILLIGRAM(S): 5 TABLET ORAL at 20:46

## 2024-06-12 RX ADMIN — OXYCODONE HYDROCHLORIDE 5 MILLIGRAM(S): 5 TABLET ORAL at 21:46

## 2024-06-12 RX ADMIN — Medication 1 APPLICATION(S): at 07:13

## 2024-06-12 RX ADMIN — Medication 1000 MILLIGRAM(S): at 14:43

## 2024-06-12 NOTE — PRE-ANESTHESIA EVALUATION ADULT - NSANTHLABRESULTSFT_GEN_ALL_CORE
CBC, BMP, Coags reviewed, unremarkable. Clofazimine Pregnancy And Lactation Text: This medication is Pregnancy Category C and isn't considered safe during pregnancy. It is excreted in breast milk.

## 2024-06-12 NOTE — OCCUPATIONAL THERAPY INITIAL EVALUATION ADULT - ADDITIONAL COMMENTS
Pt lives w/ his wife in private house w/ x6 stairs to enter. Pt states that he was independent prior to admission w/o use of AEs/DMEs.

## 2024-06-12 NOTE — OCCUPATIONAL THERAPY INITIAL EVALUATION ADULT - IMPAIRED TRANSFERS: SIT/STAND, REHAB EVAL
impaired coordination/decreased flexibility/pain/impaired postural control/decreased ROM/decreased strength

## 2024-06-12 NOTE — OCCUPATIONAL THERAPY INITIAL EVALUATION ADULT - GENERAL OBSERVATIONS, REHAB EVAL
Pt's RN Tom aware of intent to eval/tx; cleared Pt. Pt received in chair - +IVs, heplock, L knee incisional dressing intact w/ ice pack. Pt agreeable to OT.

## 2024-06-12 NOTE — OCCUPATIONAL THERAPY INITIAL EVALUATION ADULT - RANGE OF MOTION EXAMINATION, LOWER EXTREMITY
RLE AROm, PROm at WFL, WNL; L hip and knee AROM limited (post op pain), L ankle AROm, PROM at WFL, WNL

## 2024-06-12 NOTE — OCCUPATIONAL THERAPY INITIAL EVALUATION ADULT - IMPAIRED TRANSFERS: TOILET, REHAB EVAL
impaired coordination/decreased flexibility/impaired motor control/pain/impaired postural control/decreased ROM/decreased strength

## 2024-06-12 NOTE — OCCUPATIONAL THERAPY INITIAL EVALUATION ADULT - FINE MOTOR COORDINATION, LEFT HAND, MANIPULATION OF OBJECTS, OT EVAL
You can access the [facility], offered by [facility], by registering with the following website: [email or web address] normal performance

## 2024-06-12 NOTE — PHYSICAL THERAPY INITIAL EVALUATION ADULT - BED MOBILITY LIMITATIONS, REHAB EVAL
Assessment/Plan:       Diagnoses and all orders for this visit:    Major depressive disorder, recurrent, severe w/o psychotic behavior ([facility] )  -     buPROPion (WELLBUTRIN) 75 mg tablet; Take 1 tablet (75 mg total) by mouth 2 (two) times a day    Generalized anxiety disorder  -     LORazepam (ATIVAN) 0 5 mg tablet; 1-2 tabs at bedtime as directed    Insomnia due to other mental disorder  -     LORazepam (ATIVAN) 0 5 mg tablet; 1-2 tabs at bedtime as directed    Obesity (BMI 30 0-34  9)    Severe episode of recurrent major depressive disorder, without psychotic features ([facility] )  -     ARIPiprazole (ABILIFY) 5 mg tablet; Take 1 tablet (5 mg total) by mouth daily Take 1 tablet by mouth daily    Severe recurrent major depression without psychotic features ([facility])  -     escitalopram (LEXAPRO) 10 mg tablet; Take 1 tablet (10 mg total) by mouth daily    Hyperlipidemia, unspecified hyperlipidemia type  -     fenofibrate (TRICOR) 145 mg tablet; Take 1 tablet (145 mg total) by mouth daily    Numbness  -     gabapentin (NEURONTIN) 300 mg capsule; Take 1 capsule (300 mg total) by mouth 2 (two) times a day    Essential hypertension  -     lisinopril (ZESTRIL) 20 mg tablet; Take 1 tablet (20 mg total) by mouth daily Take 1 tablet by mouth daily    Primary insomnia  -     traZODone (DESYREL) 50 mg tablet; Take 2 tablets (100 mg total) by mouth once at bedtime    Bulging lumbar disc  -     traMADol (ULTRAM) 50 mg tablet; 2 tablets every 4 hours    Myofascial pain syndrome  -     traMADol (ULTRAM) 50 mg tablet; 2 tablets every 4 hours    Screening for prostate cancer  -     PSA Total (Reflex To Free); Future  -     Urinalysis with reflex to microscopic; Future    Screening for cardiovascular condition  -     CBC; Future  -     Comprehensive metabolic panel; Future  -     Lipid panel;  Future      patient overall doing well  He was unable to get his BuSpar and his temazepam due to manufacturing issues  So we stopped those today and will start lorazepam at night for sleep and Wellbutrin to replace the buspar  Labs ordered  Here follow-up in 6 months or sooner if needed         Subjective:     Chief Complaint   Patient presents with    Follow-up     med check         Patient ID: Johnson Alexis is a 53 y o  male  HPI    The following portions of the patient's history were reviewed and updated as appropriate: allergies, current medications, past family history, past medical history, past social history, past surgical history and problem list     Review of Systems   Constitutional: Negative  HENT: Negative  Eyes: Negative  Respiratory: Negative  Cardiovascular: Negative  Gastrointestinal: Negative  Endocrine: Negative  Genitourinary: Negative  Musculoskeletal: Negative  Skin: Negative  Allergic/Immunologic: Negative  Neurological: Negative  Hematological: Negative  Psychiatric/Behavioral: Negative  All other systems reviewed and are negative  Objective:    Vitals:    08/08/19 0817 08/08/19 0840   BP: 140/100 120/70   BP Location: Left arm    Patient Position: Sitting    Cuff Size: Standard    Pulse: 82    Resp: 18    Temp: 98 7 °F (37 1 °C)    TempSrc: Tympanic    SpO2: 94%    Weight: 125 kg (275 lb 9 6 oz)    Height: 6' 6" (1 981 m)           Physical Exam   Constitutional: He is oriented to person, place, and time  He appears well-developed and well-nourished  No distress  HENT:   Head: Normocephalic  Right Ear: External ear normal    Left Ear: External ear normal    Nose: Nose normal    Mouth/Throat: Oropharynx is clear and moist    Eyes: Pupils are equal, round, and reactive to light  Conjunctivae and EOM are normal  Right eye exhibits no discharge  Left eye exhibits no discharge  Neck: Normal range of motion  Cardiovascular: Normal rate, regular rhythm and normal heart sounds  Pulmonary/Chest: Effort normal and breath sounds normal    Abdominal: Soft   Bowel sounds are normal  He exhibits no distension  There is no tenderness  Musculoskeletal: Normal range of motion  Neurological: He is alert and oriented to person, place, and time  No cranial nerve deficit  Skin: Skin is warm and dry  No rash noted  Psychiatric: He has a normal mood and affect  His behavior is normal  Judgment and thought content normal    Nursing note and vitals reviewed  BMI Counseling: Body mass index is 31 85 kg/m²  Discussed the patient's BMI with him  The BMI is above average  BMI counseling and education was provided to the patient  Nutrition recommendations include reducing portion sizes, decreasing overall calorie intake, 3-5 servings of fruits/vegetables daily, reducing fast food intake, consuming healthier snacks, decreasing soda and/or juice intake, moderation in carbohydrate intake, increasing intake of lean protein, reducing intake of saturated fat and trans fat and reducing intake of cholesterol  Exercise recommendations include exercising 3-5 times per week  bed mobility not assessed; pt received in standing and left in chair

## 2024-06-12 NOTE — OCCUPATIONAL THERAPY INITIAL EVALUATION ADULT - PHYSICAL ASSIST/NONPHYSICAL ASSIST:DRESS UPPER BODY, OT EVAL
Patient discharged to home in stable condition with mom and dad. Bands verified with mom and dad.  
Problem: Lilly Care  Goal: Lilly has at least two successful feeding interactions  Outcome: Outcome Met, Continue evaluating goal progress toward completion     20   OTHER   Feeding Route Breast   Feeding Information   Swallow Assessment Audible;Visible   Feeding Tolerance Tolerates well   Feeding Observed/Reported feeding reported   Infant Feeding Assessment   Suck Assessment Organized;Burst-pause pattern     Goal: Infant does not have pain / discomfort per NIPS Scale  Outcome: Outcome Met, Continue evaluating goal progress toward completion     20   NIPS (/Infant Pain Scale)   NIPS Score 0         
verbal cues/1 person assist

## 2024-06-12 NOTE — PHYSICAL THERAPY INITIAL EVALUATION ADULT - GAIT DEVIATIONS NOTED, PT EVAL
overall steady gait, req VC to keep RW close to self/decreased anel/decreased velocity of limb motion/decreased step length/decreased weight-shifting ability

## 2024-06-12 NOTE — OCCUPATIONAL THERAPY INITIAL EVALUATION ADULT - PERTINENT HX OF CURRENT PROBLEM, REHAB EVAL
72 y/o male presents with c/o left knee pain despite conservative therapies for his symptoms. Takes tylenol as needed for his pain. Denies history of blood clots or use of assistive walking devices.   Of note had prior surgery to left knee ~10 years ago, pt unsure of procedure states he had "muscle tear."  Patient elects to undergo Left Total knee replacement.

## 2024-06-12 NOTE — PHYSICAL THERAPY INITIAL EVALUATION ADULT - GENERAL OBSERVATIONS, REHAB EVAL
Pt received standing at bedside with TYLER Santos in no acute distress, +heplock +L knee dressing C/D/I

## 2024-06-12 NOTE — PHYSICAL THERAPY INITIAL EVALUATION ADULT - ADDITIONAL COMMENTS
As per pt, PTA he was independent with functional mobility, ADLs, and IADLs with no AD. Has a shower chair with grab bars, has a small seat that can be used for it. Pt states his toilet seat is low.

## 2024-06-12 NOTE — PHYSICAL THERAPY INITIAL EVALUATION ADULT - PERTINENT HX OF CURRENT PROBLEM, REHAB EVAL
74 y/o male presents with c/o left knee pain despite conservative therapies for his symptoms. Takes tylenol as needed for his pain. Denies history of blood clots or use of assistive walking devices.   Of note had prior surgery to left knee ~10 years ago, pt unsure of procedure states he had "muscle tear." Patient elects to undergo Left Total knee replacement with Dr. Alcazar.

## 2024-06-13 ENCOUNTER — TRANSCRIPTION ENCOUNTER (OUTPATIENT)
Age: 74
End: 2024-06-13

## 2024-06-13 LAB
ANION GAP SERPL CALC-SCNC: 7 MMOL/L — SIGNIFICANT CHANGE UP (ref 5–17)
BUN SERPL-MCNC: 21 MG/DL — SIGNIFICANT CHANGE UP (ref 7–23)
CALCIUM SERPL-MCNC: 8.8 MG/DL — SIGNIFICANT CHANGE UP (ref 8.4–10.5)
CHLORIDE SERPL-SCNC: 104 MMOL/L — SIGNIFICANT CHANGE UP (ref 96–108)
CO2 SERPL-SCNC: 24 MMOL/L — SIGNIFICANT CHANGE UP (ref 22–31)
CREAT SERPL-MCNC: 1.08 MG/DL — SIGNIFICANT CHANGE UP (ref 0.5–1.3)
EGFR: 72 ML/MIN/1.73M2 — SIGNIFICANT CHANGE UP
GLUCOSE SERPL-MCNC: 172 MG/DL — HIGH (ref 70–99)
HCT VFR BLD CALC: 35.8 % — LOW (ref 39–50)
HGB BLD-MCNC: 11.7 G/DL — LOW (ref 13–17)
MCHC RBC-ENTMCNC: 31.4 PG — SIGNIFICANT CHANGE UP (ref 27–34)
MCHC RBC-ENTMCNC: 32.7 GM/DL — SIGNIFICANT CHANGE UP (ref 32–36)
MCV RBC AUTO: 96 FL — SIGNIFICANT CHANGE UP (ref 80–100)
NRBC # BLD: 0 /100 WBCS — SIGNIFICANT CHANGE UP (ref 0–0)
PLATELET # BLD AUTO: 186 K/UL — SIGNIFICANT CHANGE UP (ref 150–400)
POTASSIUM SERPL-MCNC: 4.5 MMOL/L — SIGNIFICANT CHANGE UP (ref 3.5–5.3)
POTASSIUM SERPL-SCNC: 4.5 MMOL/L — SIGNIFICANT CHANGE UP (ref 3.5–5.3)
RBC # BLD: 3.73 M/UL — LOW (ref 4.2–5.8)
RBC # FLD: 12.9 % — SIGNIFICANT CHANGE UP (ref 10.3–14.5)
SODIUM SERPL-SCNC: 135 MMOL/L — SIGNIFICANT CHANGE UP (ref 135–145)
TROPONIN T, HIGH SENSITIVITY RESULT: 11 NG/L — SIGNIFICANT CHANGE UP (ref 0–51)
WBC # BLD: 15.15 K/UL — HIGH (ref 3.8–10.5)
WBC # FLD AUTO: 15.15 K/UL — HIGH (ref 3.8–10.5)

## 2024-06-13 PROCEDURE — 99222 1ST HOSP IP/OBS MODERATE 55: CPT

## 2024-06-13 RX ORDER — POLYETHYLENE GLYCOL 3350 17 G/17G
17 POWDER, FOR SOLUTION ORAL
Qty: 0 | Refills: 0 | DISCHARGE
Start: 2024-06-13

## 2024-06-13 RX ORDER — PANTOPRAZOLE SODIUM 20 MG/1
40 TABLET, DELAYED RELEASE ORAL
Refills: 0 | Status: ACTIVE | OUTPATIENT
Start: 2024-06-13 | End: 2025-05-12

## 2024-06-13 RX ORDER — ASPIRIN/CALCIUM CARB/MAGNESIUM 324 MG
1 TABLET ORAL
Qty: 10 | Refills: 0
Start: 2024-06-13 | End: 2024-06-22

## 2024-06-13 RX ORDER — CALCIUM CARBONATE 500(1250)
1 TABLET ORAL EVERY 6 HOURS
Refills: 0 | Status: ACTIVE | OUTPATIENT
Start: 2024-06-13 | End: 2025-05-12

## 2024-06-13 RX ORDER — ACETAMINOPHEN 500 MG
2 TABLET ORAL
Qty: 0 | Refills: 0 | DISCHARGE
Start: 2024-06-13

## 2024-06-13 RX ORDER — IRON SUCROSE 20 MG/ML
500 INJECTION, SOLUTION INTRAVENOUS EVERY 24 HOURS
Refills: 0 | Status: DISCONTINUED | OUTPATIENT
Start: 2024-06-13 | End: 2024-06-13

## 2024-06-13 RX ORDER — OXYCODONE HYDROCHLORIDE 5 MG/1
1 TABLET ORAL
Qty: 30 | Refills: 0
Start: 2024-06-13

## 2024-06-13 RX ORDER — PANTOPRAZOLE SODIUM 20 MG/1
1 TABLET, DELAYED RELEASE ORAL
Qty: 14 | Refills: 0
Start: 2024-06-13 | End: 2024-06-26

## 2024-06-13 RX ORDER — CELECOXIB 200 MG/1
1 CAPSULE ORAL
Qty: 28 | Refills: 0
Start: 2024-06-13 | End: 2024-06-26

## 2024-06-13 RX ORDER — SIMETHICONE 80 MG/1
80 TABLET, CHEWABLE ORAL THREE TIMES A DAY
Refills: 0 | Status: DISCONTINUED | OUTPATIENT
Start: 2024-06-13 | End: 2024-06-13

## 2024-06-13 RX ADMIN — CELECOXIB 200 MILLIGRAM(S): 200 CAPSULE ORAL at 05:51

## 2024-06-13 RX ADMIN — Medication 30 MILLILITER(S): at 11:40

## 2024-06-13 RX ADMIN — Medication 1000 MILLIGRAM(S): at 05:51

## 2024-06-13 RX ADMIN — OXYCODONE HYDROCHLORIDE 10 MILLIGRAM(S): 5 TABLET ORAL at 11:28

## 2024-06-13 RX ADMIN — PANTOPRAZOLE SODIUM 40 MILLIGRAM(S): 20 TABLET, DELAYED RELEASE ORAL at 06:14

## 2024-06-13 RX ADMIN — SENNA PLUS 2 TABLET(S): 8.6 TABLET ORAL at 21:42

## 2024-06-13 RX ADMIN — POLYETHYLENE GLYCOL 3350 17 GRAM(S): 17 POWDER, FOR SOLUTION ORAL at 21:42

## 2024-06-13 RX ADMIN — Medication 1000 MILLIGRAM(S): at 06:51

## 2024-06-13 RX ADMIN — Medication 1000 MILLIGRAM(S): at 21:42

## 2024-06-13 RX ADMIN — Medication 325 MILLIGRAM(S): at 11:30

## 2024-06-13 RX ADMIN — Medication 1 TABLET(S): at 14:29

## 2024-06-13 RX ADMIN — OXYCODONE HYDROCHLORIDE 10 MILLIGRAM(S): 5 TABLET ORAL at 06:00

## 2024-06-13 RX ADMIN — OXYCODONE HYDROCHLORIDE 10 MILLIGRAM(S): 5 TABLET ORAL at 07:00

## 2024-06-13 RX ADMIN — CELECOXIB 200 MILLIGRAM(S): 200 CAPSULE ORAL at 06:51

## 2024-06-13 RX ADMIN — CELECOXIB 200 MILLIGRAM(S): 200 CAPSULE ORAL at 17:24

## 2024-06-13 RX ADMIN — OXYCODONE HYDROCHLORIDE 10 MILLIGRAM(S): 5 TABLET ORAL at 12:28

## 2024-06-13 RX ADMIN — Medication 1000 MILLIGRAM(S): at 13:11

## 2024-06-13 RX ADMIN — OXYCODONE HYDROCHLORIDE 10 MILLIGRAM(S): 5 TABLET ORAL at 23:41

## 2024-06-13 NOTE — DISCHARGE NOTE PROVIDER - HOSPITAL COURSE
Admitted on 6/12  Attending: Dr. Alcazar   Surgery: Left Total Knee Replacement   Chyna-op Antibiotics  Pain control  DVT prophylaxis: 325mg daily   OOB/Physical Therapy/Occupational Therapy   Medicine Consult   Admitted on 6/12  Attending: Dr. Alcazar   Surgery: Left Total Knee Replacement 6/12  Chyna-op Antibiotics  Pain control  DVT prophylaxis: asa 325mg daily   OOB/Physical Therapy  Medicine Consult

## 2024-06-13 NOTE — DISCHARGE NOTE NURSING/CASE MANAGEMENT/SOCIAL WORK - PATIENT PORTAL LINK FT
You can access the FollowMyHealth Patient Portal offered by [facility] by registering at the following website: [email or web address]. By joining "[employer]"’s FollowMyHealth portal, you will also be able to view your health information using other applications (apps) compatible with our system.

## 2024-06-13 NOTE — CONSULT NOTE ADULT - SUBJECTIVE AND OBJECTIVE BOX
Patient was cleared for his surgery by outpatient provider.  See below for orthopedic HPI.   "History of Present Illness:  Reason for Admission: left knee pain  History of Present Illness:   72 y/o male presents with c/o left knee pain despite conservative therapies for his symptoms. Takes tylenol as needed for his pain. Denies history of blood clots or use of assistive walking devices.   Of note had prior surgery to left knee ~10 years ago, pt unsure of procedure states he had "muscle tear."  Patient elects to undergo Left Total knee replacement with Dr. Alcazar.       Review of Systems:  Review of Systems: musculoskeletal: +joint pain at the left knee  see HPI  Other Review of Systems: All other review of systems negative, except as noted in HPI      Allergies and Intolerances:        Allergies:  	No Known Allergies:     Home Medications:   * Patient Currently Takes Medications as of 07-Apr-2022 13:52 documented in Structured Notes  · 	Outpatient physical therapy : 3 times a week for 3 weeks.   	ICD10: Z96.651  	s/p Right TKR  · 	losartan 50 mg oral tablet: 1 tab(s) orally once a day  · 	aspirin 325 mg oral delayed release tablet: 1 tab(s) orally once a day MDD:1  · 	metoprolol succinate 50 mg oral tablet, extended release: 1 tab(s) orally once a day  · 	amLODIPine 5 mg oral tablet: 1 tab(s) orally once a day  · 	hydroCHLOROthiazide 12.5 mg oral capsule: 1 cap(s) orally once a day  · 	senna oral tablet: 2 tab(s) orally once a day (at bedtime)  · 	oxyCODONE 5 mg oral tablet: 1 tab(s) orally every 4 hours, As Needed -severe Pain (7 - 19) MDD:6  · 	acetaminophen 325 mg oral tablet: 2 tab(s) orally every 6 hours    Patient History:   Past Medical, Past Surgical, and Family History:  PAST MEDICAL HISTORY:  HTN (hypertension)     Osteoarthritis.     PAST SURGICAL HISTORY:  H/O cystoscopy kdiney stones    H/O shoulder surgery left    History of surgery left quadriceps tear.    Social History:  · Substance use	No  · Social History (marital status, living situation, occupation, and sexual history)	per chart review never smoker  "    Vital Signs Last 24 Hrs  T(C): 36.3 (13 Jun 2024 09:07), Max: 36.7 (12 Jun 2024 16:01)  T(F): 97.4 (13 Jun 2024 09:07), Max: 98 (12 Jun 2024 16:01)  HR: 60 (13 Jun 2024 09:07) (60 - 84)  BP: 104/63 (13 Jun 2024 09:07) (99/58 - 118/72)  BP(mean): --  RR: 17 (13 Jun 2024 09:07) (17 - 18)  SpO2: 95% (13 Jun 2024 09:07) (95% - 98%)    Parameters below as of 13 Jun 2024 09:07  Patient On (Oxygen Delivery Method): room air    Physical exam  General: nad, lying in bed  heent: ncat, mmm  cards: rrr, normal s1s2, no mrg  pulm: ctab, no wheeze, crackles, rales or rhonchi  ab: soft, ntnd  ext: ice on L knee, no asymmetry noted  neuro: speech is fluent, no facial asymmetry

## 2024-06-13 NOTE — DISCHARGE NOTE PROVIDER - NSDCACTIVITY_GEN_ALL_CORE
No restrictions/Walking - Indoors allowed/No heavy lifting/straining/Walking - Outdoors allowed/Follow Instructions Provided by your Surgical Team Do not drive or operate machinery/Showering allowed/Stairs allowed/Walking - Indoors allowed/No heavy lifting/straining/Walking - Outdoors allowed/Follow Instructions Provided by your Surgical Team

## 2024-06-13 NOTE — DISCHARGE NOTE PROVIDER - CARE PROVIDER_API CALL
Rafael Alcazar  Orthopaedic Surgery  [address] [place] [address], [address]  [place] [address]  Phone: [phone]  Fax: [phone]  Follow Up Time: 2 weeks

## 2024-06-13 NOTE — DISCHARGE NOTE PROVIDER - NSDCFUADDINST_GEN_ALL_CORE_FT
s/p: Left TKA     ACTIVITY:   - Weight bear as tolerated with assistive device. No strenuous activity, heavy lifting, driving or returning to work until cleared by MD.   - Apply a cold compress to the surgical site several times daily to reduce pain and swelling. For icing, twenty-minute sessions followed by an hour off is recommended. You should ice as frequently as possible. Ice should NEVER be placed directly on the skin. Wearing compression stockings during the first week after surgery can help reduce swelling in your knee, calf and foot, but is not required.      (KNEE REPLACEMENTS)   DO place a pillow under your heel when elevating the leg, to encourage full extension of the knee. Do NOT place a pillow behind your knee for comfort, as this can lead to permanent difficulty straightening your leg. It is normal to develop some swelling in the leg, ankle, and foot because of gravity.     DRESSING/SHOWERING:   (AQUACEL – brown gel dressing)   - You may shower, your dressing is water-resistant. Do not soak in bathtubs. Remove dressing after postop day 7, then leave incision open to air. You may do this yourself (simply peel it off), or you may ask for assistance from your visiting nurse. Keep your incision clean and dry. Do not pick at your incision. Do not apply creams, ointments or oils to your incision until cleared by your surgeon. Do not soak your incision in sitting water (ie tubs, pools, lakes, etc.) until cleared by your surgeon. Do not scrub the incision – instead, allow soap and water to flow over the incision and then pat it dry with a clean towel.       MEDICATION/ANTICOAGULATION:   -You have been prescribed Aspirin, as a preventative to help prevent postoperative blood clots. Please take this medication as prescribed.    - You have been prescribed medications for pain:   - Tylenol for mild to moderate pain. Do not exceed 3,000mg daily.   - For more severe pain, take Tylenol with the addition of narcotic pain medication. Take this medication as prescribed. This medication may cause drowsiness or dizziness. Do not operate machinery. This medication may cause constipation.   - For any additional medications, follow instructions on the bottle.    -Try to have regular bowel movements. Take stool softener or laxative if necessary. You may wish to take Miralax daily until you have regular bowel movements.    - If you have been prescribed Aspirin or an anti-inflammatory, please take prilosec (omeprazole) once a day, before breakfast, until no longer taking Aspirin or anti-inflammatory. This will help protect your stomach.   - If you have a pain management physician, please follow-up with them postoperatively.    - If you experience any negative side effects of your medications, please call your surgeon's office to discuss.     FOLLOW-UP:   - Call to schedule an appt with Dr. Alcazar for follow up.   - Please follow-up with your primary care physician or any other specialist you see postoperatively, if needed.    - Contact your doctor or go to the emergency room if you experience: fever greater than 101.5, chills, chest pain, difficulty breathing, redness or excessive drainage around the incision, other concerns.   s/p: Left TKA     ACTIVITY:   - Weight bear as tolerated with assistive device. No strenuous activity, heavy lifting, driving or returning to work until cleared by MD.   - Apply a cold compress to the surgical site several times daily to reduce pain and swelling. For icing, twenty-minute sessions followed by an hour off is recommended. You should ice as frequently as possible. Ice should NEVER be placed directly on the skin. Wearing compression stockings during the first week after surgery can help reduce swelling in your knee, calf and foot, but is not required.      (KNEE REPLACEMENTS)   DO place a pillow under your heel when elevating the leg, to encourage full extension of the knee. Do NOT place a pillow behind your knee for comfort, as this can lead to permanent difficulty straightening your leg. It is normal to develop some swelling in the leg, ankle, and foot because of gravity.     DRESSING/SHOWERING:   (AQUACEL – brown gel dressing)   - You may shower, your dressing is water-resistant. Do not soak in bathtubs. Remove dressing after postop day 7, then leave incision open to air. You may do this yourself (simply peel it off), or you may ask for assistance from your visiting nurse. Keep your incision clean and dry. Do not pick at your incision. Do not apply creams, ointments or oils to your incision until cleared by your surgeon. Do not soak your incision in sitting water (ie tubs, pools, lakes, etc.) until cleared by your surgeon. Do not scrub the incision – instead, allow soap and water to flow over the incision and then pat it dry with a clean towel.     MEDICATION/ANTICOAGULATION:   -You have been prescribed Aspirin, as a preventative to help prevent postoperative blood clots. Please take this medication as prescribed: 325mg daily for 10 days postop.  - You have been prescribed medications for pain:   - Tylenol for mild to moderate pain. Do not exceed 3,000mg daily.   - For more severe pain, take Tylenol with the addition of narcotic pain medication. Take this medication as prescribed. This medication may cause drowsiness or dizziness. Do not operate machinery. This medication may cause constipation.   - For any additional medications, follow instructions on the bottle.    -Try to have regular bowel movements. Take stool softener or laxative if necessary. You may wish to take Miralax daily until you have regular bowel movements.    - If you have been prescribed Aspirin or an anti-inflammatory, please take prilosec (omeprazole) once a day, before breakfast, until no longer taking Aspirin or anti-inflammatory. This will help protect your stomach.   - If you have a pain management physician, please follow-up with them postoperatively.    - If you experience any negative side effects of your medications, please call your surgeon's office to discuss.     FOLLOW-UP:   - Call to schedule an appt with Dr. Alcazar for follow up.   - Please follow-up with your PCP & cardiologist in 2-3 weeks.    - Contact your doctor or go to the emergency room if you experience: fever greater than 101.5, chills, chest pain, difficulty breathing, redness or excessive drainage around the incision, other concerns.

## 2024-06-13 NOTE — CONSULT NOTE ADULT - ASSESSMENT
73M with PMH of HTN and OA presenting for elective L TKR.     #Post-operative state  #Post-operative anemia  #Leukocytosis  - plan per primary team  - bowel regimen  - dvt ppx per primary team  - no fever to suggest infection at this time, repeat CBC tomorrow    60 minutes spent on this encounter, including face to face with patient, care coordination and documentation. Discussed with orthopedic team.

## 2024-06-13 NOTE — DISCHARGE NOTE PROVIDER - NSDCMRMEDTOKEN_GEN_ALL_CORE_FT
acetaminophen 500 mg oral tablet: 2 tab(s) orally every 8 hours  amLODIPine 5 mg oral tablet: 1 tab(s) orally once a day  aspirin 325 mg oral tablet: 1 tab(s) orally once a day  celecoxib 200 mg oral capsule: 1 cap(s) orally every 12 hours  losartan 50 mg oral tablet: 1 tab(s) orally once a day  metoprolol succinate 50 mg oral tablet, extended release: 1 tab(s) orally once a day  oxyCODONE 5 mg oral tablet: 1 tab(s) orally every 4 to 6 hours as needed for  severe pain MDD: 5  pantoprazole 40 mg oral delayed release tablet: 1 tab(s) orally once a day (before a meal)  polyethylene glycol 3350 oral powder for reconstitution: 17 gram(s) orally once a day (at bedtime)

## 2024-06-14 LAB
ANION GAP SERPL CALC-SCNC: 6 MMOL/L — SIGNIFICANT CHANGE UP (ref 5–17)
BUN SERPL-MCNC: 24 MG/DL — HIGH (ref 7–23)
CALCIUM SERPL-MCNC: 8.9 MG/DL — SIGNIFICANT CHANGE UP (ref 8.4–10.5)
CHLORIDE SERPL-SCNC: 108 MMOL/L — SIGNIFICANT CHANGE UP (ref 96–108)
CO2 SERPL-SCNC: 26 MMOL/L — SIGNIFICANT CHANGE UP (ref 22–31)
CREAT SERPL-MCNC: 1.05 MG/DL — SIGNIFICANT CHANGE UP (ref 0.5–1.3)
EGFR: 74 ML/MIN/1.73M2 — SIGNIFICANT CHANGE UP
GLUCOSE SERPL-MCNC: 103 MG/DL — HIGH (ref 70–99)
HCT VFR BLD CALC: 32.4 % — LOW (ref 39–50)
HGB BLD-MCNC: 10.7 G/DL — LOW (ref 13–17)
MCHC RBC-ENTMCNC: 31.8 PG — SIGNIFICANT CHANGE UP (ref 27–34)
MCHC RBC-ENTMCNC: 33 GM/DL — SIGNIFICANT CHANGE UP (ref 32–36)
MCV RBC AUTO: 96.1 FL — SIGNIFICANT CHANGE UP (ref 80–100)
NRBC # BLD: 0 /100 WBCS — SIGNIFICANT CHANGE UP (ref 0–0)
PLATELET # BLD AUTO: 156 K/UL — SIGNIFICANT CHANGE UP (ref 150–400)
POTASSIUM SERPL-MCNC: 4.7 MMOL/L — SIGNIFICANT CHANGE UP (ref 3.5–5.3)
POTASSIUM SERPL-SCNC: 4.7 MMOL/L — SIGNIFICANT CHANGE UP (ref 3.5–5.3)
RBC # BLD: 3.37 M/UL — LOW (ref 4.2–5.8)
RBC # FLD: 13.8 % — SIGNIFICANT CHANGE UP (ref 10.3–14.5)
SODIUM SERPL-SCNC: 140 MMOL/L — SIGNIFICANT CHANGE UP (ref 135–145)
WBC # BLD: 8.7 K/UL — SIGNIFICANT CHANGE UP (ref 3.8–10.5)
WBC # FLD AUTO: 8.7 K/UL — SIGNIFICANT CHANGE UP (ref 3.8–10.5)

## 2024-06-14 PROCEDURE — 99232 SBSQ HOSP IP/OBS MODERATE 35: CPT

## 2024-06-14 RX ADMIN — OXYCODONE HYDROCHLORIDE 10 MILLIGRAM(S): 5 TABLET ORAL at 17:52

## 2024-06-14 RX ADMIN — OXYCODONE HYDROCHLORIDE 10 MILLIGRAM(S): 5 TABLET ORAL at 00:41

## 2024-06-14 RX ADMIN — SENNA PLUS 2 TABLET(S): 8.6 TABLET ORAL at 21:14

## 2024-06-14 RX ADMIN — Medication 1000 MILLIGRAM(S): at 05:58

## 2024-06-14 RX ADMIN — OXYCODONE HYDROCHLORIDE 10 MILLIGRAM(S): 5 TABLET ORAL at 11:30

## 2024-06-14 RX ADMIN — OXYCODONE HYDROCHLORIDE 10 MILLIGRAM(S): 5 TABLET ORAL at 16:52

## 2024-06-14 RX ADMIN — Medication 1000 MILLIGRAM(S): at 21:13

## 2024-06-14 RX ADMIN — AMLODIPINE BESYLATE 5 MILLIGRAM(S): 2.5 TABLET ORAL at 05:59

## 2024-06-14 RX ADMIN — Medication 325 MILLIGRAM(S): at 12:08

## 2024-06-14 RX ADMIN — OXYCODONE HYDROCHLORIDE 10 MILLIGRAM(S): 5 TABLET ORAL at 21:14

## 2024-06-14 RX ADMIN — OXYCODONE HYDROCHLORIDE 10 MILLIGRAM(S): 5 TABLET ORAL at 22:14

## 2024-06-14 RX ADMIN — PANTOPRAZOLE SODIUM 40 MILLIGRAM(S): 20 TABLET, DELAYED RELEASE ORAL at 05:58

## 2024-06-14 RX ADMIN — CELECOXIB 200 MILLIGRAM(S): 200 CAPSULE ORAL at 16:52

## 2024-06-14 RX ADMIN — OXYCODONE HYDROCHLORIDE 10 MILLIGRAM(S): 5 TABLET ORAL at 10:30

## 2024-06-14 RX ADMIN — CELECOXIB 200 MILLIGRAM(S): 200 CAPSULE ORAL at 05:58

## 2024-06-14 RX ADMIN — Medication 1000 MILLIGRAM(S): at 13:34

## 2024-06-14 NOTE — PROGRESS NOTE ADULT - ASSESSMENT
73M with PMH of HTN and OA presenting for elective L TKR.     #Post-operative state  #Post-operative anemia  #Leukocytosis (resolved)  - plan per primary team  - bowel regimen  - dvt ppx per primary team  - Hb 10.7 today, did receive fluids.  No signs/symptoms of bleeding at this time  - patient felt dizzy after taking oxycodone, recommend discussing with patient about other options  - PT consult  - check orthostatic vital signs    #RBBB  #1st degree AV block  #HTN  - patient has known RBBB which was seen on preoperative EKG (patient saw his cardiologist and had a stress test in April before this planned admission) - it seems that there has been some progression of the RBBB since April 2024.  Has has a slight 1st degree AV block.  Troponin checked yesterday due to some GERD symptoms, and was negative.  Discussed EKG findings with patient, and recommend that he follows up with his cardiologist.    - can still continue metoprolol    Moderate level of medical decision making required for this case, including the presence of two stable chronic medical issues, personal review of EKG and discussion of plan with the orthopedic team.

## 2024-06-15 VITALS
OXYGEN SATURATION: 95 % | SYSTOLIC BLOOD PRESSURE: 148 MMHG | TEMPERATURE: 98 F | HEART RATE: 66 BPM | DIASTOLIC BLOOD PRESSURE: 86 MMHG | RESPIRATION RATE: 17 BRPM

## 2024-06-15 PROCEDURE — 97161 PT EVAL LOW COMPLEX 20 MIN: CPT

## 2024-06-15 PROCEDURE — 97165 OT EVAL LOW COMPLEX 30 MIN: CPT

## 2024-06-15 PROCEDURE — 97535 SELF CARE MNGMENT TRAINING: CPT

## 2024-06-15 PROCEDURE — 80048 BASIC METABOLIC PNL TOTAL CA: CPT

## 2024-06-15 PROCEDURE — 36415 COLL VENOUS BLD VENIPUNCTURE: CPT

## 2024-06-15 PROCEDURE — C1776: CPT

## 2024-06-15 PROCEDURE — C1713: CPT

## 2024-06-15 PROCEDURE — 84484 ASSAY OF TROPONIN QUANT: CPT

## 2024-06-15 PROCEDURE — 85027 COMPLETE CBC AUTOMATED: CPT

## 2024-06-15 PROCEDURE — 73560 X-RAY EXAM OF KNEE 1 OR 2: CPT

## 2024-06-15 PROCEDURE — 97116 GAIT TRAINING THERAPY: CPT

## 2024-06-15 PROCEDURE — 97530 THERAPEUTIC ACTIVITIES: CPT

## 2024-06-15 RX ADMIN — Medication 50 MILLIGRAM(S): at 05:41

## 2024-06-15 RX ADMIN — AMLODIPINE BESYLATE 5 MILLIGRAM(S): 2.5 TABLET ORAL at 05:41

## 2024-06-15 RX ADMIN — Medication 1000 MILLIGRAM(S): at 05:41

## 2024-06-15 RX ADMIN — CELECOXIB 200 MILLIGRAM(S): 200 CAPSULE ORAL at 05:41

## 2024-06-15 RX ADMIN — PANTOPRAZOLE SODIUM 40 MILLIGRAM(S): 20 TABLET, DELAYED RELEASE ORAL at 05:41

## 2024-06-15 RX ADMIN — OXYCODONE HYDROCHLORIDE 10 MILLIGRAM(S): 5 TABLET ORAL at 09:56

## 2024-06-15 RX ADMIN — Medication 325 MILLIGRAM(S): at 09:57

## 2024-06-15 NOTE — PROGRESS NOTE ADULT - SUBJECTIVE AND OBJECTIVE BOX
Feels like he's had a bit of a setback.  His range of motion of the knee is worse today.  Took oxycodone this morning and felt a little dizzy afterwards.  This has happened to him before.  Denies any chest pain, shortness of breath or other concerning symptoms.  Wondering if there are any other pain medication options that wouldn't cause dizziness.     Remaining ROS negative       PHYSICAL EXAM:    General: NAD, sitting up in chair  HEENT: NC/AT; MMM  Cardiovascular: +S1/S2, RRR, no mrg  Respiratory: CTA B/L; no W/R/R  Gastrointestinal: soft, NT/ND; +BSx4  Extremities: WWP; no edema  Neurological: speech is fluent, no facial asymmetry, follows commands, LLE motion limited 2/2 to pain, no acute/focal deficits noted  Psychiatric: pleasant mood and affect    VITAL SIGNS:  Vital Signs Last 24 Hrs  T(C): 36.4 (14 Jun 2024 08:35), Max: 36.8 (13 Jun 2024 20:22)  T(F): 97.6 (14 Jun 2024 08:35), Max: 98.2 (13 Jun 2024 20:22)  HR: 68 (14 Jun 2024 08:35) (60 - 68)  BP: 119/71 (14 Jun 2024 08:35) (99/59 - 119/71)  BP(mean): --  RR: 16 (14 Jun 2024 08:35) (16 - 17)  SpO2: 95% (14 Jun 2024 08:35) (95% - 96%)    Parameters below as of 14 Jun 2024 08:35  Patient On (Oxygen Delivery Method): room air      MEDICATIONS:  MEDICATIONS  (STANDING):  acetaminophen     Tablet .. 1000 milliGRAM(s) Oral every 8 hours  amLODIPine   Tablet 5 milliGRAM(s) Oral daily  aspirin 325 milliGRAM(s) Oral daily  celecoxib 200 milliGRAM(s) Oral every 12 hours  HYDROmorphone  Injectable 0.5 milliGRAM(s) IV Push once  lactated ringers. 1000 milliLiter(s) (100 mL/Hr) IV Continuous <Continuous>  metoprolol succinate ER 50 milliGRAM(s) Oral daily  pantoprazole    Tablet 40 milliGRAM(s) Oral before breakfast  polyethylene glycol 3350 17 Gram(s) Oral at bedtime  senna 2 Tablet(s) Oral at bedtime    MEDICATIONS  (PRN):  calcium carbonate    500 mG (Tums) Chewable 1 Tablet(s) Chew every 6 hours PRN Indigestion  magnesium hydroxide Suspension 30 milliLiter(s) Oral daily PRN Constipation  ondansetron Injectable 4 milliGRAM(s) IV Push every 6 hours PRN Nausea and/or Vomiting  oxyCODONE    IR 5 milliGRAM(s) Oral every 4 hours PRN Moderate Pain (4 - 6)  oxyCODONE    IR 10 milliGRAM(s) Oral every 4 hours PRN Severe Pain (7 - 10)      ALLERGIES:  Allergies    No Known Allergies    Intolerances        LABS:                        10.7   8.70  )-----------( 156      ( 14 Jun 2024 05:30 )             32.4     06-14    140  |  108  |  24<H>  ----------------------------<  103<H>  4.7   |  26  |  1.05    Ca    8.9      14 Jun 2024 05:30        Urinalysis Basic - ( 14 Jun 2024 05:30 )    Color: x / Appearance: x / SG: x / pH: x  Gluc: 103 mg/dL / Ketone: x  / Bili: x / Urobili: x   Blood: x / Protein: x / Nitrite: x   Leuk Esterase: x / RBC: x / WBC x   Sq Epi: x / Non Sq Epi: x / Bacteria: x      CAPILLARY BLOOD GLUCOSE          RADIOLOGY & ADDITIONAL TESTS: Reviewed.
Ortho Note    Pt comfortable without complaints, pain controlled.  Denies CP, SOB, N/V, numbness/tingling     Vital Signs Last 24 Hrs  T(C): 36.5 (06-13-24 @ 05:22), Max: 36.5 (06-13-24 @ 05:22)  T(F): 97.7 (06-13-24 @ 05:22), Max: 97.7 (06-13-24 @ 05:22)  HR: 62 (06-13-24 @ 06:00) (61 - 62)  BP: 111/62 (06-13-24 @ 06:00) (99/58 - 111/62)  BP(mean): --  RR: 17 (06-13-24 @ 05:22) (17 - 17)  SpO2: 95% (06-13-24 @ 05:22) (95% - 95%)  I&O's Summary    12 Jun 2024 07:01  -  13 Jun 2024 07:00  --------------------------------------------------------  IN: 0 mL / OUT: 1000 mL / NET: -1000 mL      General: Pt Alert and oriented, NAD  Aquacel DSG C/D/I  Pulses: 2+ DP/PT b/l  Sensation: SILT b/l  Motor: Quad/Ham/EHL/FHL/TA/GS  5/5                          11.7   15.15 )-----------( 186      ( 13 Jun 2024 05:30 )             35.8     06-13    135  |  104  |  21  ----------------------------<  172<H>  4.5   |  24  |  1.08    Ca    8.8      13 Jun 2024 05:30        A/P: 73yMale POD#1 s/p L TKA  - Stable  - Pain Control  - DVT ppx: ASA  - PT, WBS: WBAT  - Dispo: HPT    Ortho Pager [phone]
Ortho Note    Pt comfortable, pain controlled.  Denies CP, SOB, N/V, numbness/tingling     Vital Signs Last 24 Hrs  T(C): 36.6 (06-14-24 @ 15:37), Max: 36.6 (06-14-24 @ 15:37)  T(F): 97.8 (06-14-24 @ 15:37), Max: 97.8 (06-14-24 @ 15:37)  HR: 74 (06-14-24 @ 15:37) (74 - 74)  BP: 115/70 (06-14-24 @ 15:37) (115/70 - 115/70)  BP(mean): --  RR: 16 (06-14-24 @ 15:37) (16 - 16)  SpO2: 93% (06-14-24 @ 15:37) (93% - 93%)  I&O's Summary      General: Pt Alert and oriented, NAD  DSG C/D/I left knee with aquacel  Pulses intact LLE  Sensation intact LLE  Motor: EHL/FHL/TA/GS 5/5 LLE                          10.7   8.70  )-----------( 156      ( 14 Jun 2024 05:30 )             32.4     06-14    140  |  108  |  24<H>  ----------------------------<  103<H>  4.7   |  26  |  1.05    Ca    8.9      14 Jun 2024 05:30        A/P: 73yMale POD#2 s/p Left TKR  - Stable  - Pain Control  - DVT ppx: asa  - PT, WBS: wbat    Ortho Pager [phone]
Ortho Note    Resting comfortably. Lab holiday. VSS. Pending LISA.  Denies CP, SOB, N/V, numbness/tingling     Vital Signs Last 24 Hrs  T(C): 36.6 (15 Catalino 2024 05:16), Max: 36.6 (14 Jun 2024 15:37)  T(F): 97.8 (15 Catalino 2024 05:16), Max: 97.8 (14 Jun 2024 15:37)  HR: 72 (15 Catalino 2024 05:16) (72 - 74)  BP: 137/82 (15 Catalino 2024 05:16) (115/70 - 142/85)  BP(mean): --  RR: 18 (15 Catalino 2024 05:16) (16 - 18)  SpO2: 93% (15 Catalino 2024 05:16) (93% - 97%)    Parameters below as of 15 Catalino 2024 05:16  Patient On (Oxygen Delivery Method): room air    General: Pt Alert and oriented, NAD  Aquacel DSG C/D/I  Pulses: 2+ DP/PT b/l  Sensation: SILT b/l  Motor: Quad/Ham/EHL/FHL/TA/GS  5/5               A/P: 73yMale s/p L TKA on 6/12.  - Stable  - Pain Control  - DVT ppx: ASA  - PT, WBS: WBAT  - Dispo: HPT    Ortho Pager [phone]
Ortho Note    s/p Left TKA POD #1  Patient seen and examined at bedside this AM   Pt comfortable without complaints, pain controlled  Denies CP, SOB, N/V, numbness/tingling     Vital Signs Last 24 Hrs  T(C): 36.3 (06-13-24 @ 09:07), Max: 36.5 (06-13-24 @ 05:22)  T(F): 97.4 (06-13-24 @ 09:07), Max: 97.7 (06-13-24 @ 05:22)  HR: 60 (06-13-24 @ 09:07) (60 - 62)  BP: 104/63 (06-13-24 @ 09:07) (99/58 - 111/62)  BP(mean): --  RR: 17 (06-13-24 @ 09:07) (17 - 17)  SpO2: 95% (06-13-24 @ 09:07) (95% - 95%)      General: Pt Alert and oriented, NAD  Dressing C/D/I: Aquacel left knee   bilateral LE warm and well perfused   Sensation: intact to light touch bilateral LE   Motor: EHL/FHL/TA/GS 5/5 bilaterally         A/P: 73yMale POD#1 s/p left TKA     - Labs and vitals stable, WBC 15, afebrile, likely reactive, will monitor   - Pain Control: IV and PO PRN   - DVT ppx: Aspirin 325mg daily   - PT, WBS: WBAT  - d/c home with HPT pending functional progress    Ortho Pager [phone]
Ortho Post Op Check    Procedure: Left TKA   Surgeon: Dr. Alcazar    Patient seen and examined at bedside in the PACU   Pt comfortable without complaints, pain controlled  Denies CP, SOB, N/V, numbness/tingling     Vital Signs Last 24 Hrs  T(C): 36.4 (06-12-24 @ 10:29), Max: 36.7 (06-12-24 @ 07:20)  T(F): 97.5 (06-12-24 @ 10:29), Max: 97.5 (06-12-24 @ 10:29)  HR: 60 (06-12-24 @ 11:02) (58 - 68)  BP: 109/73 (06-12-24 @ 11:02) (106/67 - 120/75)  BP(mean): 87 (06-12-24 @ 11:02) (82 - 90)  RR: 14 (06-12-24 @ 11:02) (9 - 27)  SpO2: 99% (06-12-24 @ 11:02) (95% - 99%)  AVSS    General: Pt Alert and oriented, NAD  Dressing C/D/I: Aquacel over left knee   Pulses: bilateral LE warm and well perfused   Sensation: intact to light touch bilateral L E   Motor: EHL/FHL/TA/GS 5/5 bilaterally         Post-op X-Ray: left TKA in anatomic alignment     A/P: 73yMale POD#0 s/p left TKA     - Stable in PACU   - Pain Control: IV and PO PRN   - DVT ppx: Aspirin 325mg daily   - Post op abx: ancef x2 doses post op   - PT, WBS: WBAT LLE, PT  - Admit to regional     Ortho Pager [phone]

## 2024-06-21 DIAGNOSIS — Z79.891 LONG TERM (CURRENT) USE OF OPIATE ANALGESIC: ICD-10-CM

## 2024-06-21 DIAGNOSIS — I10 ESSENTIAL (PRIMARY) HYPERTENSION: ICD-10-CM

## 2024-06-21 DIAGNOSIS — K59.00 CONSTIPATION, UNSPECIFIED: ICD-10-CM

## 2024-06-21 DIAGNOSIS — I45.10 UNSPECIFIED RIGHT BUNDLE-BRANCH BLOCK: ICD-10-CM

## 2024-06-21 DIAGNOSIS — M17.12 UNILATERAL PRIMARY OSTEOARTHRITIS, LEFT KNEE: ICD-10-CM

## 2024-06-21 DIAGNOSIS — I44.0 ATRIOVENTRICULAR BLOCK, FIRST DEGREE: ICD-10-CM

## 2024-06-21 DIAGNOSIS — Z96.651 PRESENCE OF RIGHT ARTIFICIAL KNEE JOINT: ICD-10-CM

## 2024-06-21 DIAGNOSIS — R27.0 ATAXIA, UNSPECIFIED: ICD-10-CM

## 2024-06-21 DIAGNOSIS — Z79.82 LONG TERM (CURRENT) USE OF ASPIRIN: ICD-10-CM

## 2024-06-21 DIAGNOSIS — Z87.442 PERSONAL HISTORY OF URINARY CALCULI: ICD-10-CM

## 2024-07-09 ENCOUNTER — APPOINTMENT (OUTPATIENT)
Dept: ORTHOPEDIC SURGERY | Facility: CLINIC | Age: 74
End: 2024-07-09
Payer: MEDICARE

## 2024-07-09 DIAGNOSIS — M54.16 RADICULOPATHY, LUMBAR REGION: ICD-10-CM

## 2024-07-09 PROCEDURE — 99024 POSTOP FOLLOW-UP VISIT: CPT

## 2024-07-09 RX ORDER — METHYLPREDNISOLONE 4 MG/1
4 TABLET ORAL
Qty: 1 | Refills: 1 | Status: ACTIVE | COMMUNITY
Start: 2024-07-09 | End: 1900-01-01

## 2024-07-09 RX ORDER — OXYCODONE AND ACETAMINOPHEN 7.5; 325 MG/1; MG/1
7.5-325 TABLET ORAL TWICE DAILY
Qty: 20 | Refills: 0 | Status: ACTIVE | COMMUNITY
Start: 2024-07-09 | End: 1900-01-01

## 2024-07-23 ENCOUNTER — APPOINTMENT (OUTPATIENT)
Dept: ORTHOPEDIC SURGERY | Facility: CLINIC | Age: 74
End: 2024-07-23
Payer: MEDICARE

## 2024-07-23 DIAGNOSIS — M17.12 UNILATERAL PRIMARY OSTEOARTHRITIS, LEFT KNEE: ICD-10-CM

## 2024-07-23 PROCEDURE — 99024 POSTOP FOLLOW-UP VISIT: CPT

## 2024-07-23 RX ORDER — CELECOXIB 200 MG/1
200 CAPSULE ORAL
Qty: 24 | Refills: 1 | Status: ACTIVE | COMMUNITY
Start: 2024-07-23 | End: 1900-01-01

## 2024-07-23 RX ORDER — OXYCODONE 5 MG/1
5 TABLET ORAL
Qty: 20 | Refills: 0 | Status: ACTIVE | COMMUNITY
Start: 2024-07-23 | End: 1900-01-01

## 2024-07-23 RX ORDER — CELECOXIB 200 MG/1
200 CAPSULE ORAL
Qty: 30 | Refills: 0 | Status: ACTIVE | COMMUNITY
Start: 2024-07-23 | End: 1900-01-01

## 2024-07-23 NOTE — REASON FOR VISIT
[Post-Operative Visit] : a post-operative visit for [Artificial Knee Joint] : an artificial knee joint [Total Knee Replacement Surgery, Aftercare] : total knee replacement surgery, aftercare [FreeTextEntry2] : LEFT KNEE REPLACED 6/12. POA VISIT

## 2024-07-23 NOTE — HISTORY OF PRESENT ILLNESS
[de-identified] : Patient returns today approximately 6 weeks status post left knee replacement.  Patient is doing his in-house physical therapy states the pain is slowly improving states the pain is worse at night.  He also has a fair amount of discomfort going downstairs going up stairs has improved somewhat in the last few weeks.

## 2024-07-23 NOTE — PHYSICAL EXAM
[de-identified] : Left knee wound is healed appropriate swelling neurovascular intact distally range of motion 0 to 115 degrees.

## 2024-07-23 NOTE — DISCUSSION/SUMMARY
[de-identified] : Patient I talked about how the night pain with knee replacement should be abating in the next week or 2.  This is a typical course for most patients undergoing this procedure.  I have indicated that it is okay with me to prescribe a limited number of pain pills to be taken only at nighttime if pain is still severe.  But as stated within 2 weeks this should be minimal.  Patient also given a refill of Celebrex to be taken either in addition or instead of the pain pills over the next 2 to 3 weeks.  Patient will also be transition to outpatient physical therapy follow-up in 1 month's time.

## 2024-08-20 ENCOUNTER — APPOINTMENT (OUTPATIENT)
Dept: ORTHOPEDIC SURGERY | Facility: CLINIC | Age: 74
End: 2024-08-20

## 2024-10-29 ENCOUNTER — APPOINTMENT (OUTPATIENT)
Dept: ORTHOPEDIC SURGERY | Facility: CLINIC | Age: 74
End: 2024-10-29
Payer: MEDICARE

## 2024-10-29 PROBLEM — T84.84XD PAIN DUE TO TOTAL LEFT KNEE REPLACEMENT, SUBSEQUENT ENCOUNTER: Status: ACTIVE | Noted: 2024-10-29

## 2024-10-29 PROCEDURE — 99214 OFFICE O/P EST MOD 30 MIN: CPT

## 2024-10-29 RX ORDER — METHYLPREDNISOLONE 4 MG/1
4 TABLET ORAL
Qty: 1 | Refills: 1 | Status: ACTIVE | COMMUNITY
Start: 2024-10-29 | End: 1900-01-01

## 2024-11-05 ENCOUNTER — APPOINTMENT (OUTPATIENT)
Dept: ORTHOPEDIC SURGERY | Facility: CLINIC | Age: 74
End: 2024-11-05
Payer: MEDICARE

## 2024-11-05 DIAGNOSIS — T84.84XD PAIN DUE TO INTERNAL ORTHOPEDIC PROSTHETIC DEVICES, IMPLANTS AND GRAFTS, SUBSEQUENT ENCOUNTER: ICD-10-CM

## 2024-11-05 DIAGNOSIS — Z96.652 PAIN DUE TO INTERNAL ORTHOPEDIC PROSTHETIC DEVICES, IMPLANTS AND GRAFTS, SUBSEQUENT ENCOUNTER: ICD-10-CM

## 2024-11-05 PROCEDURE — 99214 OFFICE O/P EST MOD 30 MIN: CPT

## 2024-11-05 RX ORDER — METHYLPREDNISOLONE 4 MG/1
4 TABLET ORAL
Qty: 1 | Refills: 1 | Status: ACTIVE | COMMUNITY
Start: 2024-11-05 | End: 1900-01-01

## 2025-03-04 ENCOUNTER — APPOINTMENT (OUTPATIENT)
Dept: ORTHOPEDIC SURGERY | Facility: CLINIC | Age: 75
End: 2025-03-04
Payer: MEDICARE

## 2025-03-04 DIAGNOSIS — T84.84XD PAIN DUE TO INTERNAL ORTHOPEDIC PROSTHETIC DEVICES, IMPLANTS AND GRAFTS, SUBSEQUENT ENCOUNTER: ICD-10-CM

## 2025-03-04 DIAGNOSIS — Z96.652 PAIN DUE TO INTERNAL ORTHOPEDIC PROSTHETIC DEVICES, IMPLANTS AND GRAFTS, SUBSEQUENT ENCOUNTER: ICD-10-CM

## 2025-03-04 PROCEDURE — 99214 OFFICE O/P EST MOD 30 MIN: CPT

## 2025-06-24 ENCOUNTER — APPOINTMENT (OUTPATIENT)
Dept: ORTHOPEDIC SURGERY | Facility: CLINIC | Age: 75
End: 2025-06-24

## 2025-08-22 ENCOUNTER — APPOINTMENT (OUTPATIENT)
Dept: ORTHOPEDIC SURGERY | Facility: CLINIC | Age: 75
End: 2025-08-22
Payer: MEDICARE

## 2025-08-22 VITALS — HEIGHT: 68 IN | RESPIRATION RATE: 16 BRPM | WEIGHT: 170 LBS | BODY MASS INDEX: 25.76 KG/M2

## 2025-08-22 DIAGNOSIS — M79.672 PAIN IN LEFT FOOT: ICD-10-CM

## 2025-08-22 PROCEDURE — 73630 X-RAY EXAM OF FOOT: CPT | Mod: RT

## 2025-08-22 PROCEDURE — 99214 OFFICE O/P EST MOD 30 MIN: CPT

## 2025-08-22 RX ORDER — LOSARTAN POTASSIUM 100 MG/1
100 TABLET, FILM COATED ORAL
Refills: 0 | Status: ACTIVE | COMMUNITY

## 2025-08-22 RX ORDER — METOPROLOL TARTRATE 100 MG/1
100 TABLET ORAL
Refills: 0 | Status: ACTIVE | COMMUNITY

## (undated) DEVICE — STRYKER 4-PORT MANIFOLD W/SPECIMEN COLLECTION

## (undated) DEVICE — SAW BLADE STRYKER SAGITTAL 25X86.5X1.32MM

## (undated) DEVICE — DRAPE 1/2 SHEET 40X57"

## (undated) DEVICE — SUT VICRYL 2-0 27" CT-1 UNDYED

## (undated) DEVICE — ELCTR STRYKER NEPTUNE SMOKE EVACUATION PENCIL (GREEN)

## (undated) DEVICE — SUT VICRYL 2-0 27" CT-1

## (undated) DEVICE — SAW BLADE STRYKER SAGITTAL DUAL CUT TEETH 35X64X.64MM

## (undated) DEVICE — SUT VICRYL 0 36" CT-1 UNDYED

## (undated) DEVICE — WARMING BLANKET UPPER ADULT

## (undated) DEVICE — VENODYNE/SCD SLEEVE CALF MEDIUM

## (undated) DEVICE — DRSG COBAN 6"

## (undated) DEVICE — STAPLER SKIN PROXIMATE

## (undated) DEVICE — PACK TOTAL KNEE

## (undated) DEVICE — DRAPE 3/4 SHEET 52X76"

## (undated) DEVICE — WOUND IRR IRRISEPT W 0.5 CHG

## (undated) DEVICE — PREP CHLORAPREP HI-LITE ORANGE 26ML

## (undated) DEVICE — SUT STRATAFIX SYMMETRIC PDS 1 45CM OS-6

## (undated) DEVICE — DRSG WEBRIL 6"

## (undated) DEVICE — SUT STRATAFIX SPIRAL MONOCRYL PLUS 3-0 30CM PS-1 UNDYED

## (undated) DEVICE — DRSG AQUACEL 3.5 X 10"

## (undated) DEVICE — POSITIONER FOAM EGG CRATE ULNAR 2PCS (PINK)

## (undated) DEVICE — DRAPE STERI-DRAPE INCISE 32X33"

## (undated) DEVICE — DRSG ACE BANDAGE 6"

## (undated) DEVICE — GLV 8 PROTEXIS (WHITE)

## (undated) DEVICE — MARKING PEN W RULER

## (undated) DEVICE — SUT VICRYL 1 36" CT-1 UNDYED

## (undated) DEVICE — HOOD T7 PLUS PEELAWAY

## (undated) DEVICE — SOL IRR BAG NS 0.9% 3000ML

## (undated) DEVICE — SUT STRATAFIX SPIRAL PDO 1 30CM OS-6